# Patient Record
Sex: FEMALE | Race: WHITE | NOT HISPANIC OR LATINO | Employment: UNEMPLOYED | URBAN - METROPOLITAN AREA
[De-identification: names, ages, dates, MRNs, and addresses within clinical notes are randomized per-mention and may not be internally consistent; named-entity substitution may affect disease eponyms.]

---

## 2023-08-14 ENCOUNTER — HOSPITAL ENCOUNTER (OUTPATIENT)
Dept: DATA CONVERSION | Facility: HOSPITAL | Age: 63
Discharge: HOME | End: 2023-08-14

## 2023-08-14 DIAGNOSIS — R05.9 COUGH, UNSPECIFIED: ICD-10-CM

## 2023-08-14 DIAGNOSIS — R06.02 SHORTNESS OF BREATH: ICD-10-CM

## 2023-09-16 VITALS
OXYGEN SATURATION: 97 % | BODY MASS INDEX: 17.36 KG/M2 | DIASTOLIC BLOOD PRESSURE: 78 MMHG | HEIGHT: 66 IN | TEMPERATURE: 98.1 F | HEART RATE: 89 BPM | SYSTOLIC BLOOD PRESSURE: 122 MMHG | WEIGHT: 108 LBS

## 2023-09-19 PROBLEM — G89.29 OTHER CHRONIC PAIN: Status: ACTIVE | Noted: 2023-09-19

## 2023-09-19 PROBLEM — H02.88B MEIBOMIAN GLAND DYSFUNCTION (MGD) OF UPPER AND LOWER EYELID OF LEFT EYE: Status: ACTIVE | Noted: 2023-09-19

## 2023-09-19 PROBLEM — H26.9 CATARACT: Status: ACTIVE | Noted: 2023-09-19

## 2023-09-19 PROBLEM — C50.919 BREAST CANCER (MULTI): Status: ACTIVE | Noted: 2023-09-19

## 2023-09-19 PROBLEM — Z15.09 BRCA GENE MUTATION TEST POSITIVE: Status: ACTIVE | Noted: 2023-09-19

## 2023-09-19 PROBLEM — E78.5 HYPERLIPIDEMIA: Status: ACTIVE | Noted: 2023-09-19

## 2023-09-19 PROBLEM — Z85.3 PERSONAL HISTORY OF MALIGNANT NEOPLASM OF BREAST: Status: ACTIVE | Noted: 2023-09-19

## 2023-09-19 PROBLEM — F41.9 ANXIETY: Status: ACTIVE | Noted: 2023-09-19

## 2023-09-19 PROBLEM — C91.10 CHRONIC LYMPHOCYTIC LEUKEMIA (MULTI): Status: ACTIVE | Noted: 2023-09-19

## 2023-09-19 PROBLEM — G56.00 CARPAL TUNNEL SYNDROME: Status: ACTIVE | Noted: 2023-09-19

## 2023-09-19 PROBLEM — M79.7 FIBROMYALGIA: Status: ACTIVE | Noted: 2023-09-19

## 2023-09-19 PROBLEM — M79.89 FOOT SWELLING: Status: ACTIVE | Noted: 2023-09-19

## 2023-09-19 PROBLEM — M81.0 OSTEOPOROSIS: Status: ACTIVE | Noted: 2023-09-19

## 2023-09-19 PROBLEM — M79.672 LEFT FOOT PAIN: Status: ACTIVE | Noted: 2023-09-19

## 2023-09-19 PROBLEM — R79.89 LOW VITAMIN B12 LEVEL: Status: ACTIVE | Noted: 2023-09-19

## 2023-09-19 PROBLEM — E55.9 VITAMIN D DEFICIENCY: Status: ACTIVE | Noted: 2023-09-19

## 2023-09-19 PROBLEM — Z95.828 PORT-A-CATH IN PLACE: Status: ACTIVE | Noted: 2023-09-19

## 2023-09-19 PROBLEM — H02.88A MEIBOMIAN GLAND DYSFUNCTION (MGD) OF UPPER AND LOWER EYELID OF RIGHT EYE: Status: ACTIVE | Noted: 2023-09-19

## 2023-09-19 PROBLEM — G60.9 HEREDITARY AND IDIOPATHIC NEUROPATHY, UNSPECIFIED: Status: ACTIVE | Noted: 2023-09-19

## 2023-09-19 PROBLEM — Z97.3 WEARS GLASSES: Status: ACTIVE | Noted: 2023-09-19

## 2023-09-19 PROBLEM — H04.123 DRY EYES: Status: ACTIVE | Noted: 2023-09-19

## 2023-09-19 PROBLEM — Z15.01 BRCA GENE MUTATION TEST POSITIVE: Status: ACTIVE | Noted: 2023-09-19

## 2023-09-19 PROBLEM — M19.90 OSTEOARTHRITIS: Status: ACTIVE | Noted: 2023-09-19

## 2023-09-19 PROBLEM — S92.919A CLOSED FRACTURE OF ONE OR MORE PHALANGES OF FOOT: Status: ACTIVE | Noted: 2023-09-19

## 2023-09-19 PROBLEM — S05.02XA CORNEAL ABRASION, LEFT: Status: ACTIVE | Noted: 2023-09-19

## 2023-09-19 PROBLEM — R07.81 RIB PAIN: Status: ACTIVE | Noted: 2023-09-19

## 2023-09-19 PROBLEM — Z96.1 BILATERAL PSEUDOPHAKIA: Status: ACTIVE | Noted: 2023-09-19

## 2023-09-19 RX ORDER — PREGABALIN 75 MG/1
1 CAPSULE ORAL 2 TIMES DAILY
COMMUNITY
Start: 2020-05-14 | End: 2023-11-28 | Stop reason: ALTCHOICE

## 2023-09-19 RX ORDER — HYDROXYZINE PAMOATE 25 MG/1
25 CAPSULE ORAL EVERY 8 HOURS PRN
COMMUNITY
End: 2024-01-02

## 2023-09-19 RX ORDER — TRIAMCINOLONE ACETONIDE 1 MG/G
1 CREAM TOPICAL
COMMUNITY
End: 2023-11-28 | Stop reason: ALTCHOICE

## 2023-09-19 RX ORDER — LORATADINE 10 MG/1
10 TABLET ORAL DAILY
COMMUNITY
End: 2023-11-28 | Stop reason: WASHOUT

## 2023-09-26 ENCOUNTER — HOSPITAL ENCOUNTER (OUTPATIENT)
Dept: DATA CONVERSION | Facility: HOSPITAL | Age: 63
Discharge: HOME | End: 2023-09-26
Payer: COMMERCIAL

## 2023-09-26 DIAGNOSIS — E78.5 HYPERLIPIDEMIA, UNSPECIFIED: ICD-10-CM

## 2023-09-26 DIAGNOSIS — E53.8 DEFICIENCY OF OTHER SPECIFIED B GROUP VITAMINS: ICD-10-CM

## 2023-09-26 DIAGNOSIS — E55.9 VITAMIN D DEFICIENCY, UNSPECIFIED: ICD-10-CM

## 2023-09-26 LAB
25(OH)D3 SERPL-MCNC: 44 NG/ML (ref 31–100)
ALBUMIN SERPL-MCNC: 4.2 GM/DL (ref 3.5–5)
ALBUMIN/GLOB SERPL: 1.4 RATIO (ref 1.5–3)
ALP BLD-CCNC: 66 U/L (ref 35–125)
ALT SERPL-CCNC: 15 U/L (ref 5–40)
ANION GAP SERPL CALCULATED.3IONS-SCNC: 12 MMOL/L (ref 0–19)
APPEARANCE PLAS: ABNORMAL
AST SERPL-CCNC: 17 U/L (ref 5–40)
BILIRUB SERPL-MCNC: 0.4 MG/DL (ref 0.1–1.2)
BUN SERPL-MCNC: 8 MG/DL (ref 8–25)
BUN/CREAT SERPL: 11.4 RATIO (ref 8–21)
CALCIUM SERPL-MCNC: 9.4 MG/DL (ref 8.5–10.4)
CHLORIDE SERPL-SCNC: 102 MMOL/L (ref 97–107)
CHOLEST SERPL-MCNC: 222 MG/DL (ref 133–200)
CHOLEST/HDLC SERPL: 2.7 RATIO
CO2 SERPL-SCNC: 24 MMOL/L (ref 24–31)
COLOR SPUN FLD: ABNORMAL
CREAT SERPL-MCNC: 0.7 MG/DL (ref 0.4–1.6)
DEPRECATED RDW RBC AUTO: 44.2 FL (ref 37–54)
ERYTHROCYTE [DISTWIDTH] IN BLOOD BY AUTOMATED COUNT: 13.2 % (ref 11.7–15)
FASTING STATUS PATIENT QL REPORTED: ABNORMAL
GFR SERPL CREATININE-BSD FRML MDRD: 96 ML/MIN/1.73 M2
GLOBULIN SER-MCNC: 3 G/DL (ref 1.9–3.7)
GLUCOSE SERPL-MCNC: 88 MG/DL (ref 65–99)
HCT VFR BLD AUTO: 43 % (ref 36–44)
HDLC SERPL-MCNC: 81 MG/DL
HGB BLD-MCNC: 14.2 GM/DL (ref 12–15)
LDLC SERPL CALC-MCNC: 124 MG/DL (ref 65–130)
MCH RBC QN AUTO: 30.1 PG (ref 26–34)
MCHC RBC AUTO-ENTMCNC: 33 % (ref 31–37)
MCV RBC AUTO: 91.1 FL (ref 80–100)
NRBC BLD-RTO: 0 /100 WBC
PLATELET # BLD AUTO: 312 K/UL (ref 150–450)
PMV BLD AUTO: 9.5 CU (ref 7–12.6)
POTASSIUM SERPL-SCNC: 4 MMOL/L (ref 3.4–5.1)
PROT SERPL-MCNC: 7.2 G/DL (ref 5.9–7.9)
RBC # BLD AUTO: 4.72 M/UL (ref 4–4.9)
SODIUM SERPL-SCNC: 137 MMOL/L (ref 133–145)
TRIGL SERPL-MCNC: 83 MG/DL (ref 40–150)
TSH SERPL DL<=0.05 MIU/L-ACNC: 1.25 MIU/L (ref 0.27–4.2)
VIT B12 SERPL-MCNC: 306 PG/ML (ref 211–946)
WBC # BLD AUTO: 8.3 K/UL (ref 4.5–11)

## 2023-10-13 ENCOUNTER — TELEPHONE (OUTPATIENT)
Dept: PRIMARY CARE | Facility: CLINIC | Age: 63
End: 2023-10-13
Payer: COMMERCIAL

## 2023-10-13 NOTE — TELEPHONE ENCOUNTER
PT called in requesting to schedule appointment for B12 injection, but is unaware of when she is due to receive it, or how many times she should come in. Please advise.

## 2023-10-19 ENCOUNTER — TELEPHONE (OUTPATIENT)
Dept: PRIMARY CARE | Facility: CLINIC | Age: 63
End: 2023-10-19
Payer: COMMERCIAL

## 2023-10-19 ENCOUNTER — APPOINTMENT (OUTPATIENT)
Dept: HEMATOLOGY/ONCOLOGY | Facility: CLINIC | Age: 63
End: 2023-10-19
Payer: COMMERCIAL

## 2023-10-19 DIAGNOSIS — E53.8 B12 DEFICIENCY: ICD-10-CM

## 2023-10-19 RX ORDER — CYANOCOBALAMIN 1000 UG/ML
1000 INJECTION, SOLUTION INTRAMUSCULAR; SUBCUTANEOUS ONCE
Status: COMPLETED | OUTPATIENT
Start: 2023-10-20 | End: 2023-10-20

## 2023-10-20 ENCOUNTER — CLINICAL SUPPORT (OUTPATIENT)
Dept: PRIMARY CARE | Facility: CLINIC | Age: 63
End: 2023-10-20
Payer: COMMERCIAL

## 2023-10-20 PROCEDURE — 2500000004 HC RX 250 GENERAL PHARMACY W/ HCPCS (ALT 636 FOR OP/ED): Performed by: INTERNAL MEDICINE

## 2023-10-20 RX ADMIN — CYANOCOBALAMIN 1000 MCG: 1000 INJECTION, SOLUTION SUBCUTANEOUS at 14:48

## 2023-10-24 RX ORDER — HEPARIN SODIUM,PORCINE/PF 10 UNIT/ML
50 SYRINGE (ML) INTRAVENOUS AS NEEDED
Status: CANCELLED | OUTPATIENT
Start: 2023-10-26

## 2023-10-24 RX ORDER — HEPARIN 100 UNIT/ML
500 SYRINGE INTRAVENOUS AS NEEDED
Status: CANCELLED | OUTPATIENT
Start: 2023-10-26

## 2023-10-26 ENCOUNTER — INFUSION (OUTPATIENT)
Dept: HEMATOLOGY/ONCOLOGY | Facility: CLINIC | Age: 63
End: 2023-10-26
Payer: COMMERCIAL

## 2023-10-26 DIAGNOSIS — Z85.3 PERSONAL HISTORY OF MALIGNANT NEOPLASM OF BREAST: ICD-10-CM

## 2023-10-26 PROCEDURE — 2500000004 HC RX 250 GENERAL PHARMACY W/ HCPCS (ALT 636 FOR OP/ED): Mod: SE | Performed by: NURSE PRACTITIONER

## 2023-10-26 PROCEDURE — 96523 IRRIG DRUG DELIVERY DEVICE: CPT

## 2023-10-26 RX ORDER — HEPARIN SODIUM,PORCINE/PF 10 UNIT/ML
50 SYRINGE (ML) INTRAVENOUS AS NEEDED
Status: CANCELLED | OUTPATIENT
Start: 2023-10-26

## 2023-10-26 RX ORDER — HEPARIN 100 UNIT/ML
500 SYRINGE INTRAVENOUS AS NEEDED
Status: DISCONTINUED | OUTPATIENT
Start: 2023-10-26 | End: 2023-10-26 | Stop reason: HOSPADM

## 2023-10-26 RX ORDER — HEPARIN SODIUM,PORCINE/PF 10 UNIT/ML
50 SYRINGE (ML) INTRAVENOUS AS NEEDED
Status: DISCONTINUED | OUTPATIENT
Start: 2023-10-26 | End: 2023-10-26 | Stop reason: HOSPADM

## 2023-10-26 RX ORDER — HEPARIN 100 UNIT/ML
500 SYRINGE INTRAVENOUS AS NEEDED
Status: CANCELLED | OUTPATIENT
Start: 2023-10-26

## 2023-10-26 RX ADMIN — HEPARIN 500 UNITS: 100 SYRINGE at 13:36

## 2023-11-06 ENCOUNTER — HOSPITAL ENCOUNTER (OUTPATIENT)
Dept: RADIOLOGY | Facility: HOSPITAL | Age: 63
Discharge: HOME | End: 2023-11-06
Payer: COMMERCIAL

## 2023-11-06 ENCOUNTER — CLINICAL SUPPORT (OUTPATIENT)
Dept: VASCULAR MEDICINE | Facility: CLINIC | Age: 63
End: 2023-11-06
Payer: COMMERCIAL

## 2023-11-06 ENCOUNTER — HOSPITAL ENCOUNTER (OUTPATIENT)
Dept: RESPIRATORY THERAPY | Facility: HOSPITAL | Age: 63
Setting detail: THERAPIES SERIES
Discharge: STILL ADMITTED OR ADMINISTRATIVE | End: 2023-11-06
Payer: COMMERCIAL

## 2023-11-06 DIAGNOSIS — M81.0 AGE-RELATED OSTEOPOROSIS WITHOUT CURRENT PATHOLOGICAL FRACTURE: ICD-10-CM

## 2023-11-06 DIAGNOSIS — Z13.6 ENCOUNTER FOR SCREENING FOR CARDIOVASCULAR DISORDERS: ICD-10-CM

## 2023-11-06 DIAGNOSIS — R06.02 SHORTNESS OF BREATH: ICD-10-CM

## 2023-11-06 PROCEDURE — 2500000002 HC RX 250 W HCPCS SELF ADMINISTERED DRUGS (ALT 637 FOR MEDICARE OP, ALT 636 FOR OP/ED)

## 2023-11-06 PROCEDURE — 94640 AIRWAY INHALATION TREATMENT: CPT

## 2023-11-06 PROCEDURE — 76706 US ABDL AORTA SCREEN AAA: CPT

## 2023-11-06 PROCEDURE — 76706 US ABDL AORTA SCREEN AAA: CPT | Performed by: INTERNAL MEDICINE

## 2023-11-06 PROCEDURE — 77085 DXA BONE DENSITY AXL VRT FX: CPT

## 2023-11-06 PROCEDURE — 94060 EVALUATION OF WHEEZING: CPT

## 2023-11-06 RX ORDER — ALBUTEROL SULFATE 0.83 MG/ML
SOLUTION RESPIRATORY (INHALATION)
Status: COMPLETED
Start: 2023-11-06 | End: 2023-11-06

## 2023-11-06 RX ADMIN — ALBUTEROL SULFATE: 2.5 SOLUTION RESPIRATORY (INHALATION) at 11:45

## 2023-11-08 ENCOUNTER — TELEPHONE (OUTPATIENT)
Dept: PRIMARY CARE | Facility: CLINIC | Age: 63
End: 2023-11-08
Payer: COMMERCIAL

## 2023-11-08 DIAGNOSIS — J44.9 CHRONIC OBSTRUCTIVE PULMONARY DISEASE, UNSPECIFIED COPD TYPE (MULTI): Primary | ICD-10-CM

## 2023-11-16 ENCOUNTER — APPOINTMENT (OUTPATIENT)
Dept: HEMATOLOGY/ONCOLOGY | Facility: CLINIC | Age: 63
End: 2023-11-16
Payer: COMMERCIAL

## 2023-11-16 DIAGNOSIS — E53.8 VITAMIN B 12 DEFICIENCY: ICD-10-CM

## 2023-11-16 RX ORDER — CYANOCOBALAMIN 1000 UG/ML
1000 INJECTION, SOLUTION INTRAMUSCULAR; SUBCUTANEOUS ONCE
Status: COMPLETED | OUTPATIENT
Start: 2023-11-20 | End: 2023-11-20

## 2023-11-20 ENCOUNTER — TELEPHONE (OUTPATIENT)
Dept: HEMATOLOGY/ONCOLOGY | Facility: CLINIC | Age: 63
End: 2023-11-20

## 2023-11-20 ENCOUNTER — CLINICAL SUPPORT (OUTPATIENT)
Dept: PRIMARY CARE | Facility: CLINIC | Age: 63
End: 2023-11-20
Payer: COMMERCIAL

## 2023-11-20 DIAGNOSIS — E53.8 B12 DEFICIENCY: ICD-10-CM

## 2023-11-20 PROCEDURE — 2500000004 HC RX 250 GENERAL PHARMACY W/ HCPCS (ALT 636 FOR OP/ED): Performed by: INTERNAL MEDICINE

## 2023-11-20 RX ADMIN — CYANOCOBALAMIN 1000 MCG: 1000 INJECTION, SOLUTION SUBCUTANEOUS at 10:09

## 2023-11-20 NOTE — TELEPHONE ENCOUNTER
Spoke to Erin.  She describes very small lymph nodes, about 5 in her groin and she has developed some chest sweating at night. Denies fever or weight loss.  We were able to move up her appt. To November 28, que draw at 9am followed by appt with Francisco at 9:30pm.  Erin is aware fo the appt change.

## 2023-11-28 ENCOUNTER — OFFICE VISIT (OUTPATIENT)
Dept: HEMATOLOGY/ONCOLOGY | Facility: CLINIC | Age: 63
End: 2023-11-28
Payer: COMMERCIAL

## 2023-11-28 ENCOUNTER — INFUSION (OUTPATIENT)
Dept: HEMATOLOGY/ONCOLOGY | Facility: CLINIC | Age: 63
End: 2023-11-28
Payer: COMMERCIAL

## 2023-11-28 VITALS
BODY MASS INDEX: 16.65 KG/M2 | TEMPERATURE: 97.7 F | DIASTOLIC BLOOD PRESSURE: 82 MMHG | WEIGHT: 103.17 LBS | HEART RATE: 98 BPM | OXYGEN SATURATION: 99 % | RESPIRATION RATE: 18 BRPM | SYSTOLIC BLOOD PRESSURE: 146 MMHG

## 2023-11-28 DIAGNOSIS — C50.911 MALIGNANT NEOPLASM OF RIGHT BREAST IN FEMALE, ESTROGEN RECEPTOR NEGATIVE, UNSPECIFIED SITE OF BREAST (MULTI): ICD-10-CM

## 2023-11-28 DIAGNOSIS — Z15.01 BRCA GENE MUTATION TEST POSITIVE: Primary | ICD-10-CM

## 2023-11-28 DIAGNOSIS — C50.811 MALIGNANT NEOPLASM OF OVERLAPPING SITES OF RIGHT FEMALE BREAST (MULTI): ICD-10-CM

## 2023-11-28 DIAGNOSIS — Z85.3 PERSONAL HISTORY OF MALIGNANT NEOPLASM OF BREAST: ICD-10-CM

## 2023-11-28 DIAGNOSIS — Z17.1 MALIGNANT NEOPLASM OF RIGHT BREAST IN FEMALE, ESTROGEN RECEPTOR NEGATIVE, UNSPECIFIED SITE OF BREAST (MULTI): ICD-10-CM

## 2023-11-28 DIAGNOSIS — C50.812 MALIGNANT NEOPLASM OF OVERLAPPING SITES OF LEFT FEMALE BREAST (MULTI): ICD-10-CM

## 2023-11-28 DIAGNOSIS — C91.10 CHRONIC LYMPHOCYTIC LEUKEMIA (MULTI): ICD-10-CM

## 2023-11-28 DIAGNOSIS — Z15.09 BRCA GENE MUTATION TEST POSITIVE: Primary | ICD-10-CM

## 2023-11-28 LAB
ALBUMIN SERPL BCP-MCNC: 4 G/DL (ref 3.4–5)
ALP SERPL-CCNC: 55 U/L (ref 33–136)
ALT SERPL W P-5'-P-CCNC: 15 U/L (ref 7–45)
ANION GAP SERPL CALC-SCNC: 12 MMOL/L (ref 10–20)
AST SERPL W P-5'-P-CCNC: 16 U/L (ref 9–39)
BASOPHILS # BLD AUTO: 0.05 X10*3/UL (ref 0–0.1)
BASOPHILS NFR BLD AUTO: 0.8 %
BILIRUB SERPL-MCNC: 0.5 MG/DL (ref 0–1.2)
BUN SERPL-MCNC: 9 MG/DL (ref 6–23)
CALCIUM SERPL-MCNC: 8.8 MG/DL (ref 8.6–10.3)
CHLORIDE SERPL-SCNC: 99 MMOL/L (ref 98–107)
CO2 SERPL-SCNC: 26 MMOL/L (ref 21–32)
CREAT SERPL-MCNC: 0.68 MG/DL (ref 0.5–1.05)
EOSINOPHIL # BLD AUTO: 0.07 X10*3/UL (ref 0–0.7)
EOSINOPHIL NFR BLD AUTO: 1.2 %
ERYTHROCYTE [DISTWIDTH] IN BLOOD BY AUTOMATED COUNT: 12.4 % (ref 11.5–14.5)
GFR SERPL CREATININE-BSD FRML MDRD: >90 ML/MIN/1.73M*2
GLUCOSE SERPL-MCNC: 107 MG/DL (ref 74–99)
HCT VFR BLD AUTO: 40.2 % (ref 36–46)
HGB BLD-MCNC: 13.4 G/DL (ref 12–16)
IMM GRANULOCYTES # BLD AUTO: 0.04 X10*3/UL (ref 0–0.7)
IMM GRANULOCYTES NFR BLD AUTO: 0.7 % (ref 0–0.9)
LDH SERPL L TO P-CCNC: 157 U/L (ref 84–246)
LYMPHOCYTES # BLD AUTO: 1.57 X10*3/UL (ref 1.2–4.8)
LYMPHOCYTES NFR BLD AUTO: 26.3 %
MCH RBC QN AUTO: 30 PG (ref 26–34)
MCHC RBC AUTO-ENTMCNC: 33.3 G/DL (ref 32–36)
MCV RBC AUTO: 90 FL (ref 80–100)
MONOCYTES # BLD AUTO: 0.44 X10*3/UL (ref 0.1–1)
MONOCYTES NFR BLD AUTO: 7.4 %
NEUTROPHILS # BLD AUTO: 3.79 X10*3/UL (ref 1.2–7.7)
NEUTROPHILS NFR BLD AUTO: 63.6 %
NRBC BLD-RTO: 0 /100 WBCS (ref 0–0)
PLATELET # BLD AUTO: 263 X10*3/UL (ref 150–450)
POTASSIUM SERPL-SCNC: 3.8 MMOL/L (ref 3.5–5.3)
PROT SERPL-MCNC: 6.5 G/DL (ref 6.4–8.2)
RBC # BLD AUTO: 4.46 X10*6/UL (ref 4–5.2)
SODIUM SERPL-SCNC: 133 MMOL/L (ref 136–145)
WBC # BLD AUTO: 6 X10*3/UL (ref 4.4–11.3)

## 2023-11-28 PROCEDURE — 83615 LACTATE (LD) (LDH) ENZYME: CPT

## 2023-11-28 PROCEDURE — 1125F AMNT PAIN NOTED PAIN PRSNT: CPT | Performed by: NURSE PRACTITIONER

## 2023-11-28 PROCEDURE — 99214 OFFICE O/P EST MOD 30 MIN: CPT | Performed by: NURSE PRACTITIONER

## 2023-11-28 PROCEDURE — 80053 COMPREHEN METABOLIC PANEL: CPT

## 2023-11-28 PROCEDURE — 85025 COMPLETE CBC W/AUTO DIFF WBC: CPT

## 2023-11-28 RX ORDER — HEPARIN SODIUM,PORCINE/PF 10 UNIT/ML
50 SYRINGE (ML) INTRAVENOUS AS NEEDED
Status: CANCELLED | OUTPATIENT
Start: 2023-11-28

## 2023-11-28 RX ORDER — FLUOCINONIDE 0.5 MG/G
CREAM TOPICAL AS NEEDED
COMMUNITY
Start: 2023-04-26

## 2023-11-28 RX ORDER — HEPARIN 100 UNIT/ML
500 SYRINGE INTRAVENOUS AS NEEDED
Status: CANCELLED | OUTPATIENT
Start: 2023-11-28

## 2023-11-28 RX ORDER — PREGABALIN 50 MG/1
50 CAPSULE ORAL 3 TIMES DAILY PRN
COMMUNITY
Start: 2023-11-24 | End: 2024-04-08

## 2023-11-28 RX ORDER — HEPARIN 100 UNIT/ML
500 SYRINGE INTRAVENOUS AS NEEDED
Status: DISCONTINUED | OUTPATIENT
Start: 2023-11-28 | End: 2023-11-28 | Stop reason: HOSPADM

## 2023-11-28 RX ORDER — HEPARIN SODIUM,PORCINE/PF 10 UNIT/ML
50 SYRINGE (ML) INTRAVENOUS AS NEEDED
Status: DISCONTINUED | OUTPATIENT
Start: 2023-11-28 | End: 2023-11-28 | Stop reason: HOSPADM

## 2023-11-28 ASSESSMENT — COLUMBIA-SUICIDE SEVERITY RATING SCALE - C-SSRS
2. HAVE YOU ACTUALLY HAD ANY THOUGHTS OF KILLING YOURSELF?: NO
1. IN THE PAST MONTH, HAVE YOU WISHED YOU WERE DEAD OR WISHED YOU COULD GO TO SLEEP AND NOT WAKE UP?: NO
6. HAVE YOU EVER DONE ANYTHING, STARTED TO DO ANYTHING, OR PREPARED TO DO ANYTHING TO END YOUR LIFE?: NO

## 2023-11-28 ASSESSMENT — PATIENT HEALTH QUESTIONNAIRE - PHQ9
SUM OF ALL RESPONSES TO PHQ9 QUESTIONS 1 AND 2: 0
2. FEELING DOWN, DEPRESSED OR HOPELESS: NOT AT ALL
1. LITTLE INTEREST OR PLEASURE IN DOING THINGS: NOT AT ALL

## 2023-11-28 ASSESSMENT — ENCOUNTER SYMPTOMS
LOSS OF SENSATION IN FEET: 1
DEPRESSION: 0
OCCASIONAL FEELINGS OF UNSTEADINESS: 1

## 2023-11-28 ASSESSMENT — PAIN SCALES - GENERAL: PAINLEVEL: 2

## 2023-11-28 NOTE — PROGRESS NOTES
Patient here for follow up with Francisco Muller. Medications and allergies reviewed. No refills needed at this time.

## 2023-11-28 NOTE — PROGRESS NOTES
ID Statement:   ELIF COLON is a 62 year old Female    Treatment Synopsis:  1. Breast cancer initially stage I on the right breast diagnosed in 2002, status post four cycles of Adriamycin and Cytoxan. ER negative, MA negative, HER-2/jose angel negative.  2. Status post right lumpectomy, axillary lymph node dissection in the right breast with radiation in 2002.  3. Left-sided breast cancer in 2005, status post bilateral mastectomies and four cycles of Taxotere and left axillary lymph node dissection.  4. Status post total abdominal hysterectomy and bilateral salpingo-oophorectomy in February of 2006.  5. BRCA 1 positive.  6. Lateral chest wall lesion excised in October of 2006 that was pathologically negative.  7. CLL/SLL stage IA with good prognostic markers of normal cytogenetics, CD38 negative, ZAP-70 negative diagnosed by right submandibular lymph node excisional biopsy in May of 2012.  8. Tobacco abuse.    Interval History:  Patient returns today for routine one-year followup evaluation for history of a stage I right-sided breast cancer, diagnosed in 2002, now status post bilateral mastectomies and four cycles of Taxotere as well as history of CLL/SLL, diagnosed by right submandibular lymph node excisional biopsy May 2012.     On presentation today she voices concerns of lymph nodes that have developed in the inguinal region bilaterally over the past 6 weeks. She also reports night sweats. She denies any fevers or chills. No cough, chest pain or shortness of breath. No nausea or vomiting. No constipation or diarrhea. She has ongoing pruritus which has been an issue for some time now. She takes lyrica and hydroxyzine as needed. She has some neuropathic pain in her wrists bilaterally, likely carpal syndrome, worse on her left arm. She has lost about 6 pounds in the past 6 months.     The patient does still have her right Mediport in place. This was exchanged on April 11, 2023.  This is being flushed on an every  six-week basis. She has had several ports since her original breast cancer diagnosis and bilateral mastectomies.     Review of Systems:  A review of systems has been completed and are negative for complaints except what is stated in the HPI and/or past medical history    Allergies:  Reviewed in EMR    Medications:  Medication list reviewed with patient and updated in EMR    Social History:  smokes about 1/4 pack per day  social alcohol   occasional marijuana     Vital Signs:  /82, pulse 98, resp 18, temp 36.5  Weight 46.8    Physical Exam:  ECO  Pain: chronic back pain  Constitutional: Well developed, awake/alert/oriented x3, no distress, alert and cooperative  Eyes: PER. sclera anicteric  ENMT: Oral mucosa moist  Respiratory/Thorax: Breathing is non-labored. Lungs are clear to auscultation bilaterally. No adventitious breath sounds  Cardiovascular: S1-S2. Regular rate and rhythm. No murmurs, rubs, or gallops appreciated  Gastrointestinal: Abdomen soft nontender, nondistended, normal active bowel sounds.   Musculoskeletal: ROM intact, no joint swelling, normal strength  Extremities: normal extremities, no cyanosis, no edema, no clubbing  Breast: s/p bilateral mastectomy no chest wall masses or lesions  Lymphatics: no cervical, supraclavicular or axillary lymphadenopathy. Multiple palpable lymph nodes noted inguinal region bilaterally.   Neurologic: alert and oriented x3. Nonfocal exam. No myoclonus  Psychological: Pleasant, appropriate and easily engaged     Labs:  2023  WBC 6.0, hemoglobin 13.4, hematocrit 40.2, platelets 263,000      Assessment:  Breast cancer:  - continue annual physical exam    CLL/SLL stage IA:  - CT chest, abd and pelvis  - - palpable inguinal lymph nodes and night sweats     Immune prophylaxis:  - Declines all immunizations.    Mediport:   - continues to flush Mediport every six weeks.      Plan:  - 1 year follow-up  - labs 6 months and 12 months  - every six  week Mediport flush.  - - follow up sooner pending results of CT scan

## 2023-12-04 ENCOUNTER — APPOINTMENT (OUTPATIENT)
Dept: RADIOLOGY | Facility: HOSPITAL | Age: 63
End: 2023-12-04
Payer: COMMERCIAL

## 2023-12-05 ENCOUNTER — ANCILLARY PROCEDURE (OUTPATIENT)
Dept: RADIOLOGY | Facility: CLINIC | Age: 63
End: 2023-12-05
Payer: COMMERCIAL

## 2023-12-05 ENCOUNTER — INFUSION (OUTPATIENT)
Dept: HEMATOLOGY/ONCOLOGY | Facility: CLINIC | Age: 63
End: 2023-12-05
Payer: COMMERCIAL

## 2023-12-05 DIAGNOSIS — Z17.1 MALIGNANT NEOPLASM OF RIGHT BREAST IN FEMALE, ESTROGEN RECEPTOR NEGATIVE, UNSPECIFIED SITE OF BREAST (MULTI): Primary | ICD-10-CM

## 2023-12-05 DIAGNOSIS — Z15.01 BRCA GENE MUTATION TEST POSITIVE: ICD-10-CM

## 2023-12-05 DIAGNOSIS — Z15.09 BRCA GENE MUTATION TEST POSITIVE: ICD-10-CM

## 2023-12-05 DIAGNOSIS — C50.911 MALIGNANT NEOPLASM OF RIGHT BREAST IN FEMALE, ESTROGEN RECEPTOR NEGATIVE, UNSPECIFIED SITE OF BREAST (MULTI): Primary | ICD-10-CM

## 2023-12-05 DIAGNOSIS — C91.10 CHRONIC LYMPHOCYTIC LEUKEMIA (MULTI): ICD-10-CM

## 2023-12-05 DIAGNOSIS — Z85.3 PERSONAL HISTORY OF MALIGNANT NEOPLASM OF BREAST: ICD-10-CM

## 2023-12-05 PROCEDURE — 74177 CT ABD & PELVIS W/CONTRAST: CPT

## 2023-12-05 PROCEDURE — 2550000001 HC RX 255 CONTRASTS: Performed by: NURSE PRACTITIONER

## 2023-12-05 PROCEDURE — 2500000004 HC RX 250 GENERAL PHARMACY W/ HCPCS (ALT 636 FOR OP/ED): Mod: SE | Performed by: NURSE PRACTITIONER

## 2023-12-05 PROCEDURE — 96523 IRRIG DRUG DELIVERY DEVICE: CPT

## 2023-12-05 RX ORDER — HEPARIN 100 UNIT/ML
500 SYRINGE INTRAVENOUS AS NEEDED
Status: CANCELLED | OUTPATIENT
Start: 2023-12-05

## 2023-12-05 RX ORDER — HEPARIN SODIUM,PORCINE/PF 10 UNIT/ML
50 SYRINGE (ML) INTRAVENOUS AS NEEDED
Status: CANCELLED | OUTPATIENT
Start: 2023-12-05

## 2023-12-05 RX ORDER — HEPARIN 100 UNIT/ML
500 SYRINGE INTRAVENOUS AS NEEDED
Status: DISCONTINUED | OUTPATIENT
Start: 2023-12-05 | End: 2023-12-05 | Stop reason: HOSPADM

## 2023-12-05 RX ORDER — HEPARIN SODIUM,PORCINE/PF 10 UNIT/ML
50 SYRINGE (ML) INTRAVENOUS AS NEEDED
Status: DISCONTINUED | OUTPATIENT
Start: 2023-12-05 | End: 2023-12-05 | Stop reason: HOSPADM

## 2023-12-05 RX ADMIN — HEPARIN 500 UNITS: 100 SYRINGE at 09:44

## 2023-12-05 RX ADMIN — IOHEXOL 75 ML: 350 INJECTION, SOLUTION INTRAVENOUS at 09:51

## 2023-12-08 ENCOUNTER — TELEPHONE (OUTPATIENT)
Dept: HEMATOLOGY/ONCOLOGY | Facility: CLINIC | Age: 63
End: 2023-12-08
Payer: COMMERCIAL

## 2023-12-12 ENCOUNTER — APPOINTMENT (OUTPATIENT)
Dept: HEMATOLOGY/ONCOLOGY | Facility: CLINIC | Age: 63
End: 2023-12-12
Payer: COMMERCIAL

## 2023-12-14 ENCOUNTER — APPOINTMENT (OUTPATIENT)
Dept: HEMATOLOGY/ONCOLOGY | Facility: CLINIC | Age: 63
End: 2023-12-14
Payer: COMMERCIAL

## 2023-12-15 DIAGNOSIS — C91.10 CHRONIC LYMPHOCYTIC LEUKEMIA (MULTI): ICD-10-CM

## 2023-12-18 ENCOUNTER — OFFICE VISIT (OUTPATIENT)
Dept: PULMONOLOGY | Facility: CLINIC | Age: 63
End: 2023-12-18
Payer: COMMERCIAL

## 2023-12-18 VITALS
HEIGHT: 66 IN | WEIGHT: 106 LBS | BODY MASS INDEX: 17.04 KG/M2 | TEMPERATURE: 98 F | SYSTOLIC BLOOD PRESSURE: 142 MMHG | HEART RATE: 82 BPM | OXYGEN SATURATION: 96 % | DIASTOLIC BLOOD PRESSURE: 86 MMHG

## 2023-12-18 DIAGNOSIS — J30.9 ALLERGIC RHINITIS, UNSPECIFIED SEASONALITY, UNSPECIFIED TRIGGER: Primary | ICD-10-CM

## 2023-12-18 DIAGNOSIS — E53.8 B12 DEFICIENCY: ICD-10-CM

## 2023-12-18 DIAGNOSIS — F17.210 CIGARETTE NICOTINE DEPENDENCE WITHOUT COMPLICATION: ICD-10-CM

## 2023-12-18 DIAGNOSIS — J44.9 CHRONIC OBSTRUCTIVE PULMONARY DISEASE, UNSPECIFIED COPD TYPE (MULTI): ICD-10-CM

## 2023-12-18 PROCEDURE — 1159F MED LIST DOCD IN RCRD: CPT | Performed by: NURSE PRACTITIONER

## 2023-12-18 PROCEDURE — 99203 OFFICE O/P NEW LOW 30 MIN: CPT | Performed by: NURSE PRACTITIONER

## 2023-12-18 PROCEDURE — 1125F AMNT PAIN NOTED PAIN PRSNT: CPT | Performed by: NURSE PRACTITIONER

## 2023-12-18 PROCEDURE — 99213 OFFICE O/P EST LOW 20 MIN: CPT | Performed by: NURSE PRACTITIONER

## 2023-12-18 RX ORDER — LORATADINE 10 MG/1
10 TABLET ORAL DAILY
Qty: 30 TABLET | Refills: 3 | Status: SHIPPED | OUTPATIENT
Start: 2023-12-18

## 2023-12-18 RX ORDER — TIOTROPIUM BROMIDE AND OLODATEROL 3.124; 2.736 UG/1; UG/1
2 SPRAY, METERED RESPIRATORY (INHALATION) DAILY
Qty: 4 G | Refills: 3 | Status: SHIPPED | OUTPATIENT
Start: 2023-12-18 | End: 2024-01-26 | Stop reason: SINTOL

## 2023-12-18 RX ORDER — ALBUTEROL SULFATE 90 UG/1
2 AEROSOL, METERED RESPIRATORY (INHALATION) EVERY 4 HOURS PRN
Qty: 18 G | Refills: 11 | Status: SHIPPED | OUTPATIENT
Start: 2023-12-18 | End: 2023-12-18 | Stop reason: SDUPTHER

## 2023-12-18 RX ORDER — LORATADINE 10 MG/1
10 TABLET ORAL DAILY
Qty: 30 TABLET | Refills: 3 | Status: SHIPPED | OUTPATIENT
Start: 2023-12-18 | End: 2023-12-18 | Stop reason: SDUPTHER

## 2023-12-18 RX ORDER — ALBUTEROL SULFATE 90 UG/1
2 AEROSOL, METERED RESPIRATORY (INHALATION) EVERY 4 HOURS PRN
Qty: 18 G | Refills: 11 | Status: SHIPPED | OUTPATIENT
Start: 2023-12-18

## 2023-12-18 RX ORDER — CYANOCOBALAMIN 1000 UG/ML
1000 INJECTION, SOLUTION INTRAMUSCULAR; SUBCUTANEOUS ONCE
Status: COMPLETED | OUTPATIENT
Start: 2023-12-20 | End: 2023-12-20

## 2023-12-18 RX ORDER — TIOTROPIUM BROMIDE AND OLODATEROL 3.124; 2.736 UG/1; UG/1
2 SPRAY, METERED RESPIRATORY (INHALATION) DAILY
Qty: 4 G | Refills: 3 | Status: SHIPPED | OUTPATIENT
Start: 2023-12-18 | End: 2023-12-18 | Stop reason: SDUPTHER

## 2023-12-18 ASSESSMENT — PAIN SCALES - GENERAL: PAINLEVEL: 4

## 2023-12-18 NOTE — PROGRESS NOTES
Subjective   Patient ID: Erin Ramachandran is a 63 y.o. female who presents for New Patient Visit.    HPI  Ms. Ramachandran is a 63 year old  female (current smoker, ~23 pack year history) with a PMHx of R breast cancer dx 2002 s/p chemo/radiation, lumpectomy & LN dissection; L breast cancer dx 2005 s/p bilateral mastectomy, chemo, LN dissection; CLL/SLL dx 2012. Here for initial evaluation for shortness of breath.     PCP: Teresa Knox    HPI: Patient reports that she is here as recommended by her PCP after she had breathing tests for her SOB. Per patient she has had ongoing shortness of breath with exertion for several years that has progressively gotten worse. She notes her SOB is worse with weather changes and certain chemicals/fumes (wood stain, bleach). She has nasal congestion and post-nasal drip. Has intermittent cough, usually non-productive but occasionally clear sputum production. She has occasional wheezing. She has never used inhalers. She denies acid reflux, chest pain, fever or chills. Denies appetite changes or unexpected weight changes. She denies BLE edema or orthopnea. Has chronic headaches, denies recent changes in frequency or severity.   CAT today 20    Previous pulmonary history:   She has no history of recurrent infections, or lung disease as a child.  She had no previous lung hx, never on oxygen or inhaler therapy.     Inhalers/nebulized medications: none     Hospitalization History: She has not been hospitalized over the last year for breathing related problem.    Sleep history:  Snores. Denies apnea, feeling tired during the day or taking naps during the day.       PMHx: R breast cancer dx 2002 s/p chemo/radiation, lumpectomy & LN dissection; L breast cancer dx 2005 s/p bilateral mastectomy, chemo, LN dissection; CLL/SLL dx 2012  Surg Hx: bilateral mastectomy    Social Hx:  -smoking: current smoker, 1 PPD x 20 years, cut back to 1/4 PPD about 10 years ago (~23 pack year history)  -ETOH:  occasional  -illicit drugs: occasional marijuana (smoking & gummies)  -occupation:   -exposures: +known asbestos exposure; denies known silica, beryllium, molds, dusts, chemicals/fumes     Testing:  PFT:  -11/6/23: moderate obstructive ventilator defect with no BD response (FEV1 62%), hyperinflation with air trapping, DLCO normal     CT Chest:  -12/5/23 CT C/A/P: IMPRESSION: Mild-moderate diffuse centrilobular and paraseptal emphysema, more prominent in upper lobes. There are several scattered calcified pleural plaques which can be r/t prior asbestos exposure. Lungs are otherwise clear. No evidence for thoracic lymphadenopathy. No evidence for acute pathology within the abdomen or pelvis. No evidence for abdominal or pelvic lymphadenopathy identified. No evidence for inguinal lymphadenopathy.       Review of Systems  10 point ROS complete and negative except as documented in HPI     Objective   Physical Exam  Vitals reviewed.   Constitutional:       General: She is not in acute distress.     Appearance: Normal appearance. She is not ill-appearing.   HENT:      Head: Normocephalic.      Nose: Nose normal.      Mouth/Throat:      Mouth: Mucous membranes are moist.      Pharynx: Oropharynx is clear.   Eyes:      General: No scleral icterus.  Cardiovascular:      Rate and Rhythm: Normal rate and regular rhythm.      Pulses: Normal pulses.      Heart sounds: Normal heart sounds. No murmur heard.  Pulmonary:      Effort: Pulmonary effort is normal. No respiratory distress.      Breath sounds: No wheezing, rhonchi or rales.      Comments: Diminished throughout; barrel chested   Abdominal:      General: Bowel sounds are normal. There is no distension.      Palpations: Abdomen is soft.   Musculoskeletal:         General: Normal range of motion.      Cervical back: Normal range of motion and neck supple.      Right lower leg: No edema.      Left lower leg: No edema.   Skin:     General: Skin is warm and dry.    Neurological:      General: No focal deficit present.      Mental Status: She is alert and oriented to person, place, and time.         Assessment/Plan   Ms. Ramachandran is a 63 year old  female (current smoker, ~23 pack year history) with a PMHx of R breast cancer dx 2002 s/p chemo/radiation, lumpectomy & LN dissection; L breast cancer dx 2005 s/p bilateral mastectomy, chemo, LN dissection; CLL/SLL dx 2012. Here for initial evaluation for shortness of breath.     1) COPD (GOLD 2) - PFT 11/2023 FEV1 62%, hyperinflation with airtrapping, DLCO normal; current smoker; +asbestos exposure  -start stioloto 2 puffs once daily  -start albuterol PRN  -avoid triggers    2) Allergic rhinitis - nasal congestion, post-nasal drip  -start claritin once daily    3) Cigarette nicotine dependence - current smoker, ~23 pack year history; 1 PPD x 20 years, cut back to about 1/4 PPD 10 years ago; CT C/A/P completed 12/5/23 by oncology, no obvious nodules   -# Smoking cessation: Patient is currently smoking: >5 minutes smoking cessation counseling   - offered pharmacologic options to assist with quitting (discussed nicotine patches & lozenges or gum but she declined; advised is available OTC or she can call if changes her mind)     Patient would like to follow-up with Faraz Kaur in [place] d/t location proximity to home going forward.     Tayler West, ESTHER-CNP 12/18/23 10:17 AM

## 2023-12-18 NOTE — PATIENT INSTRUCTIONS
"Ms. Ramachandran -     It was a pleasure to see you today. We discussed your shortness of breath. Based on your breathing test results (PFTs), you have COPD.    Here are my recommendations:  -Start Stioloto Respimat inhaler (2 puffs once daily) -- if too expensive, please call so we can see if we can find one more affordable  -Start albuterol Inhaler 1-2 puffs every 4-6 hours as needed for shortness of breath. You can also take this 10-15 minutes prior to exertional activity to help \"prime\" your lungs.  -Start loratadine (Claritin) once daily for your allergies (nasal congestion/post-nasal drip)  -We discussed smoking cessation, please continue to work on stopping smoking. You can get nicotine patches & lozenges or gum over the counter if you decide you would like to try them.     Thank you for visiting the Pulmonary clinic today!   Return to clinic in 4-6 weeks or sooner if needed -- would like to follow with Faraz Kaur at [place]   Tayler West, [place]  My office number is [phone] - Mojgan is my  and Alyson is my nurse.     [phone] - scheduling    Best way to get a hold of me is to call my office --> Please do not send me [facility] messages.  Any test results will be discussed at next visit -- please make sure to make a follow up appt after testing.      "

## 2023-12-20 ENCOUNTER — CLINICAL SUPPORT (OUTPATIENT)
Dept: PRIMARY CARE | Facility: CLINIC | Age: 63
End: 2023-12-20
Payer: COMMERCIAL

## 2023-12-20 DIAGNOSIS — E53.8 VITAMIN B12 DEFICIENCY: ICD-10-CM

## 2023-12-20 PROCEDURE — 2500000004 HC RX 250 GENERAL PHARMACY W/ HCPCS (ALT 636 FOR OP/ED): Performed by: INTERNAL MEDICINE

## 2023-12-20 RX ADMIN — CYANOCOBALAMIN 1000 MCG: 1000 INJECTION, SOLUTION INTRAMUSCULAR at 10:17

## 2023-12-21 ENCOUNTER — TELEPHONE (OUTPATIENT)
Dept: HEMATOLOGY/ONCOLOGY | Facility: CLINIC | Age: 63
End: 2023-12-21
Payer: COMMERCIAL

## 2023-12-21 DIAGNOSIS — C91.10 CHRONIC LYMPHOCYTIC LEUKEMIA (MULTI): Primary | ICD-10-CM

## 2023-12-21 NOTE — TELEPHONE ENCOUNTER
Reviewed case with Dr. Paris.  Agree it will be difficult to obtain PET scan. With normal CT scan and normal CBCd    Plan:   Flow cytometry, SPEP and quantitative immunoglobulins (next port flush)  CBCd, CMP, and LDH quarterly (will start with February port flush)

## 2024-01-09 ENCOUNTER — INFUSION (OUTPATIENT)
Dept: HEMATOLOGY/ONCOLOGY | Facility: CLINIC | Age: 64
End: 2024-01-09
Payer: COMMERCIAL

## 2024-01-09 DIAGNOSIS — C91.10 CHRONIC LYMPHOCYTIC LEUKEMIA (MULTI): ICD-10-CM

## 2024-01-09 PROCEDURE — 96523 IRRIG DRUG DELIVERY DEVICE: CPT

## 2024-01-09 NOTE — SIGNIFICANT EVENT
Unable to obtain blood from port, pt refuses activase and peripheral draw, requesting port be replaced, CASI Muller CNP notified

## 2024-01-10 ENCOUNTER — APPOINTMENT (OUTPATIENT)
Dept: RHEUMATOLOGY | Facility: CLINIC | Age: 64
End: 2024-01-10
Payer: COMMERCIAL

## 2024-01-11 ENCOUNTER — APPOINTMENT (OUTPATIENT)
Dept: HEMATOLOGY/ONCOLOGY | Facility: CLINIC | Age: 64
End: 2024-01-11
Payer: COMMERCIAL

## 2024-01-15 DIAGNOSIS — C50.919 MALIGNANT NEOPLASM OF FEMALE BREAST, UNSPECIFIED ESTROGEN RECEPTOR STATUS, UNSPECIFIED LATERALITY, UNSPECIFIED SITE OF BREAST (MULTI): ICD-10-CM

## 2024-01-15 DIAGNOSIS — C91.10 CHRONIC LYMPHOCYTIC LEUKEMIA (MULTI): Primary | ICD-10-CM

## 2024-01-15 NOTE — TELEPHONE ENCOUNTER
Visit error   Communicate Risk of Fall with Harm to all staff/Reinforce activity limits and safety measures with patient and family/Tailored Fall Risk Interventions/Visual Cue: Yellow wristband and red socks/Bed in lowest position, wheels locked, appropriate side rails in place/Call bell, personal items and telephone in reach/Instruct patient to call for assistance before getting out of bed or chair/Non-slip footwear when patient is out of bed/[place] to call system/Physically safe environment - no spills, clutter or unnecessary equipment/Purposeful Proactive Rounding/Room/bathroom lighting operational, light cord in reach

## 2024-01-22 ENCOUNTER — CLINICAL SUPPORT (OUTPATIENT)
Dept: PRIMARY CARE | Facility: CLINIC | Age: 64
End: 2024-01-22
Payer: COMMERCIAL

## 2024-01-22 DIAGNOSIS — E53.8 B12 DEFICIENCY: ICD-10-CM

## 2024-01-22 DIAGNOSIS — E53.8 VITAMIN B12 DEFICIENCY: ICD-10-CM

## 2024-01-22 PROCEDURE — 2500000004 HC RX 250 GENERAL PHARMACY W/ HCPCS (ALT 636 FOR OP/ED): Performed by: INTERNAL MEDICINE

## 2024-01-22 PROCEDURE — 96372 THER/PROPH/DIAG INJ SC/IM: CPT | Performed by: INTERNAL MEDICINE

## 2024-01-22 RX ORDER — CYANOCOBALAMIN 1000 UG/ML
1000 INJECTION, SOLUTION INTRAMUSCULAR; SUBCUTANEOUS ONCE
Status: COMPLETED | OUTPATIENT
Start: 2024-01-22 | End: 2024-01-22

## 2024-01-22 RX ADMIN — CYANOCOBALAMIN 1000 MCG: 1000 INJECTION, SOLUTION INTRAMUSCULAR at 10:01

## 2024-01-25 ENCOUNTER — PRE-ADMISSION TESTING (OUTPATIENT)
Dept: PREADMISSION TESTING | Facility: HOSPITAL | Age: 64
End: 2024-01-25
Payer: COMMERCIAL

## 2024-01-25 VITALS
SYSTOLIC BLOOD PRESSURE: 155 MMHG | WEIGHT: 112.66 LBS | HEART RATE: 113 BPM | BODY MASS INDEX: 18.11 KG/M2 | RESPIRATION RATE: 16 BRPM | HEIGHT: 66 IN | DIASTOLIC BLOOD PRESSURE: 85 MMHG | TEMPERATURE: 97.9 F

## 2024-01-25 PROCEDURE — 99203 OFFICE O/P NEW LOW 30 MIN: CPT | Performed by: PHYSICIAN ASSISTANT

## 2024-01-25 RX ORDER — ACETAMINOPHEN 500 MG
1000 TABLET ORAL EVERY 6 HOURS PRN
COMMUNITY

## 2024-01-25 ASSESSMENT — DUKE ACTIVITY SCORE INDEX (DASI)
CAN YOU HAVE SEXUAL RELATIONS: YES
CAN YOU WALK A BLOCK OR TWO ON LEVEL GROUND: YES
CAN YOU RUN A SHORT DISTANCE: NO
CAN YOU TAKE CARE OF YOURSELF (EAT, DRESS, BATHE, OR USE TOILET): YES
CAN YOU CLIMB A FLIGHT OF STAIRS OR WALK UP A HILL: YES
TOTAL_SCORE: 42.7
CAN YOU DO HEAVY WORK AROUND THE HOUSE LIKE SCRUBBING FLOORS OR LIFTING AND MOVING HEAVY FURNITURE: YES
CAN YOU PARTICIPATE IN MODERATE RECREATIONAL ACTIVITIES LIKE GOLF, BOWLING, DANCING, DOUBLES TENNIS OR THROWING A BASEBALL OR FOOTBALL: YES
CAN YOU DO YARD WORK LIKE RAKING LEAVES, WEEDING OR PUSHING A MOWER: YES
DASI METS SCORE: 8
CAN YOU PARTICIPATE IN STRENOUS SPORTS LIKE SWIMMING, SINGLES TENNIS, FOOTBALL, BASKETBALL, OR SKIING: NO
CAN YOU WALK INDOORS, SUCH AS AROUND YOUR HOUSE: YES
CAN YOU DO MODERATE WORK AROUND THE HOUSE LIKE VACUUMING, SWEEPING FLOORS OR CARRYING GROCERIES: YES
CAN YOU DO LIGHT WORK AROUND THE HOUSE LIKE DUSTING OR WASHING DISHES: YES

## 2024-01-25 ASSESSMENT — CHADS2 SCORE
PRIOR STROKE OR TIA OR THROMBOEMBOLISM: NO
CHF: NO
CHADS2 SCORE: 0
HYPERTENSION: NO
DIABETES: NO
AGE GREATER THAN OR EQUAL TO 75: NO

## 2024-01-25 ASSESSMENT — PAIN - FUNCTIONAL ASSESSMENT: PAIN_FUNCTIONAL_ASSESSMENT: 0-10

## 2024-01-25 ASSESSMENT — PAIN SCALES - GENERAL: PAINLEVEL_OUTOF10: 4

## 2024-01-25 ASSESSMENT — LIFESTYLE VARIABLES: SMOKING_STATUS: SMOKER

## 2024-01-25 ASSESSMENT — PAIN DESCRIPTION - DESCRIPTORS: DESCRIPTORS: ACHING;THROBBING

## 2024-01-25 NOTE — PROGRESS NOTES
Patient: Erin Ramachandran    [id number]  : 1960 -- AGE 63 y.o.    Provider: CASEY Patel     Location [facility]   Service Date: 2024         Department of Medicine  Division of Pulmonary, Critical Care, and Sleep Medicine         [facility]  Follow Up Visit Note      HISTORY OF PRESENT ILLNESS     PCP: Dr. Teresa Knox    HISTORY OF PRESENT ILLNESS   Erin Ramachandran is a 63 y.o. female who presents to a [facility] for a follow up visit with concerns of COPD.   I have independently interviewed and examined the patient in the office and reviewed available records.    DATE OF LAST VISIT: 2023    Current History  Patient formerly seen by my colleague Agustina West CNP on 2023.  Patient transferring care to myself due to location convenience.  She had completed a PFT study on 2023 which showed Gold 2 moderate COPD.  She was started on Stiolto as well as albuterol HFA as needed.  Also was advised Claritin daily for allergic rhinitis.  Presents for follow-up today for these prior med changes.    On today's visit, She reports that she stopped using her Stiolto inhaler about 2 to 3 weeks ago.  She tried using it daily for about 2-1/2 to 3 weeks and states that although it did somewhat help her breathing, she did start developing visual changes causing a pain behind her eyes, vision becoming more blurry and having halos in her vision.  Denies a history of glaucoma.  She is currently established with an ophthalmologist; last seen a number of months ago.  Encouraged her to call her eye doctor and get an appointment for evaluation.  We discussed that the LAMA component and Stiolto could be causing some of the symptoms; however, despite being off of Stiolto for a number of weeks the symptoms still persist.  Discussed switching her inhaler class altogether to remove the LAMA component.  Will trial an  ICS/LABA instead.  She denies shortness of breath at rest but does have dyspnea on exertion only on more extensive exertional activity.  Can walk up a flight of stairs without issue.  Reports a chronic cough which can produce an intermittent white mucus.  She does also experience intermittent wheezing; typically more weather dependent or if she is around strong fumes.  She denies GERD.  Denies chest pain or palpitations.  Continues to smoke 0.25 PPD; not currently interested in quitting.    CAT today: 20    Prior Visits & History   Pulmonary Note from Agustina West CNP  12/18/23: Ms. Ramachandran is a 63 year old  female (current smoker, ~23 pack year history) with a PMHx of R breast cancer dx 2002 s/p chemo/radiation, lumpectomy & LN dissection; L breast cancer dx 2005 s/p bilateral mastectomy, chemo, LN dissection; CLL/SLL dx 2012. Here for initial evaluation for shortness of breath.       HPI: Patient reports that she is here as recommended by her PCP after she had breathing tests for her SOB. Per patient she has had ongoing shortness of breath with exertion for several years that has progressively gotten worse. She notes her SOB is worse with weather changes and certain chemicals/fumes (wood stain, bleach). She has nasal congestion and post-nasal drip. Has intermittent cough, usually non-productive but occasionally clear sputum production. She has occasional wheezing. She has never used inhalers. She denies acid reflux, chest pain, fever or chills. Denies appetite changes or unexpected weight changes. She denies BLE edema or orthopnea. Has chronic headaches, denies recent changes in frequency or severity.   CAT today 20     Previous pulmonary history:   She has no history of recurrent infections, or lung disease as a child.  She had no previous lung hx, never on oxygen or inhaler therapy.      Inhalers/nebulized medications: none      Hospitalization History: She has not been hospitalized over the last year for  breathing related problem.     Sleep history:  Snores. Denies apnea, feeling tired during the day or taking naps during the day.       PMHx: R breast cancer dx 2002 s/p chemo/radiation, lumpectomy & LN dissection; L breast cancer dx 2005 s/p bilateral mastectomy, chemo, LN dissection; CLL/SLL dx 2012  Surg Hx: bilateral mastectomy     Social Hx:  -smoking: current smoker, 1 PPD x 20 years, cut back to 1/4 PPD about 10 years ago (~23 pack year history)  -ETOH: occasional  -illicit drugs: occasional marijuana (smoking & gummies)  -occupation:   -exposures: +known asbestos exposure; denies known silica, beryllium, molds, dusts, chemicals/fumes     Assessment/Plan  Ms. Ramachandran is a 63 year old  female (current smoker, ~23 pack year history) with a PMHx of R breast cancer dx 2002 s/p chemo/radiation, lumpectomy & LN dissection; L breast cancer dx 2005 s/p bilateral mastectomy, chemo, LN dissection; CLL/SLL dx 2012. Here for initial evaluation for shortness of breath.      1) COPD (GOLD 2) - PFT 11/2023 FEV1 62%, hyperinflation with airtrapping, DLCO normal; current smoker; +asbestos exposure  -start stioloto 2 puffs once daily  -start albuterol PRN  -avoid triggers     2) Allergic rhinitis - nasal congestion, post-nasal drip  -start claritin once daily     3) Cigarette nicotine dependence - current smoker, ~23 pack year history; 1 PPD x 20 years, cut back to about 1/4 PPD 10 years ago; CT C/A/P completed 12/5/23 by oncology, no obvious nodules   -# Smoking cessation: Patient is currently smoking: >5 minutes smoking cessation counseling   - offered pharmacologic options to assist with quitting (discussed nicotine patches & lozenges or gum but she declined; advised is available OTC or she can call if changes her mind)      Patient would like to follow-up with Faraz Kaur in [place] d/t location proximity to home going forward.   REVIEW OF SYSTEMS     REVIEW OF SYSTEMS  Review of Systems    Constitutional:  Positive for fatigue. Negative for activity change, appetite change, chills, fever and unexpected weight change.   HENT:  Positive for rhinorrhea and sinus pressure. Negative for congestion, postnasal drip, sinus pain, sneezing, sore throat, trouble swallowing and voice change.    Eyes:  Positive for redness, itching and visual disturbance.   Respiratory:  Positive for cough and shortness of breath. Negative for chest tightness, wheezing and stridor.    Cardiovascular:  Negative for chest pain, palpitations and leg swelling.        Denies orthopnea   Gastrointestinal:  Negative for abdominal pain, diarrhea, nausea and vomiting.        Denies acid reflux   Musculoskeletal:  Negative for arthralgias, back pain, joint swelling and myalgias.   Skin:  Negative for rash.   Allergic/Immunologic: Negative for immunocompromised state.   Neurological:  Positive for dizziness, weakness and headaches. Negative for tremors.   Hematological:  Does not bruise/bleed easily.   Psychiatric/Behavioral:  Negative for agitation and sleep disturbance. The patient is nervous/anxious.         Denies depression   All other systems reviewed and are negative.      ALLERGIES AND MEDICATIONS     ALLERGIES  Allergies   Allergen Reactions    Azithromycin Rash and Other     stomach cramping    Penicillins Hives and Rash    Denosumab Other and Unknown     Marked Jaw and bone pain    Gabapentin Hives and Unknown    Magnesium Sulfate Unknown    Zoledronic Acid-Mannitol-Water Other and Unknown    Adhesive Tape-Silicones Itching and Rash    Other Itching, Swelling and Rash     Metals (As Environmental Allergen)    Sulfa (Sulfonamide Antibiotics) Swelling       MEDICATIONS  Current Outpatient Medications   Medication Sig Dispense Refill    acetaminophen (Tylenol) 500 mg tablet Take 2 tablets (1,000 mg) by mouth every 6 hours if needed for mild pain (1 - 3).      fluocinonide 0.05 % cream Apply topically if needed.      hydrOXYzine  pamoate (Vistaril) 25 mg capsule TAKE ONE CAPSULE BY MOUTH EVERY 8 HOURS AS NEEDED 90 capsule 3    pregabalin (Lyrica) 50 mg capsule Take 1 capsule (50 mg) by mouth 3 times a day as needed (PAIN).      albuterol 90 mcg/actuation inhaler Inhale 2 puffs every 4 hours if needed for wheezing or shortness of breath. (Patient not taking: Reported on 1/26/2024) 18 g 11    loratadine (Claritin) 10 mg tablet Take 1 tablet (10 mg) by mouth once daily. (Patient taking differently: Take 1 tablet (10 mg) by mouth once daily as needed for allergies.) 30 tablet 3    tiotropium-olodateroL (Stiolto Respimat) 2.5-2.5 mcg/actuation mist inhaler Inhale 2 Inhalations once daily. (Patient not taking: Reported on 1/25/2024) 4 g 3     No current facility-administered medications for this visit.       PAST HISTORY     PAST MEDICAL HISTORY  She  has a past medical history of Anxiety, Awareness under anesthesia, Breast cancer (CMS/HCC), Chronic lymphocytic leukemia (CMS/HCC), COPD (chronic obstructive pulmonary disease) (CMS/[facility]), Fibromyalgia, and Neuropathy.    PAST SURGICAL HISTORY  Past Surgical History:   Procedure Laterality Date    BREAST LUMPECTOMY Right     COLONOSCOPY      HYSTERECTOMY  11/11/2013    With BSO    MASTECTOMY Bilateral 11/11/2013    Breast Surgery Mastectomy    MEDIPORT      TONSILLECTOMY  11/11/2013    Tonsillectomy    US GUIDED THYROID BIOPSY  02/20/2012    US GUIDED THYROID BIOPSY [facility]       IMMUNIZATION HISTORY  Immunization History   Administered Date(s) Administered    Pfizer Purple Cap SARS-CoV-2 05/06/2021       SOCIAL HISTORY  She  reports that she has been smoking cigarettes. She started smoking about 51 years ago. She has a 12.50 pack-year smoking history. She has never used smokeless tobacco. She reports current alcohol use of about 3.0 standard drinks of alcohol per week. She reports current drug use. Frequency: 1.00 time per week. Drug: Marijuana.       FAMILY HISTORY  Family History  "  Problem Relation Name Age of Onset    Osteoporosis Mother      Ovarian cancer Sister      Arthritis Other FamHx        PHYSICAL EXAM     VITAL SIGNS: BP (!) 178/104 (BP Location: Left leg)   Pulse 98   Ht 1.676 m (5' 6\")   Wt 49.9 kg (110 lb)   SpO2 96%   BMI 17.75 kg/m²      PREVIOUS WEIGHTS:  Wt Readings from Last 3 Encounters:   01/26/24 49.9 kg (110 lb)   01/25/24 51.1 kg (112 lb 10.5 oz)   12/18/23 48.1 kg (106 lb)       Physical Exam  Vitals reviewed.   Constitutional:       General: She is not in acute distress.     Appearance: Normal appearance. She is not ill-appearing or toxic-appearing.   HENT:      Head: Normocephalic.      Nose: No rhinorrhea.   Cardiovascular:      Rate and Rhythm: Normal rate and regular rhythm.      Heart sounds: Normal heart sounds.   Pulmonary:      Effort: Pulmonary effort is normal. No respiratory distress.      Breath sounds: Normal breath sounds. No stridor.   Abdominal:      General: Abdomen is flat.   Musculoskeletal:         General: Normal range of motion.      Right lower leg: No edema.      Left lower leg: No edema.   Skin:     General: Skin is warm and dry.      Nails: There is no clubbing.   Neurological:      General: No focal deficit present.      Mental Status: She is alert and oriented to person, place, and time.   Psychiatric:         Mood and Affect: Mood normal.         Behavior: Behavior normal.         Judgment: Judgment normal.           RESULTS/DATA     Pulmonary Function Test Results   PFT:  -11/6/23: moderate obstructive ventilator defect with borderline BD response (FEV1 62%), hyperinflation with air trapping, DLCO normal     Chest Radiograph     XR chest 2 views     Narrative  PROCEDURE:         CHEST 2 VIEW - AXR  0020  REASON FOR EXAM: sob, cough    RESULT: MRN: [id number]  Patient Name: ELIF COLON    STUDY:  CHEST 2 VIEW; 8/14/2023 12:06 pm    INDICATION:  sob, cough.    COMPARISON:  20 November 2019.    ACCESSION " NUMBER(S):  [id number]    ORDERING CLINICIAN:  GWEN SAVAGE    TECHNIQUE:  2 views of the chest were performed.    FINDINGS:  The lungs are adequately inflated without air space disease or effusion.  15.5 mm unchanged pulmonary nodule left upper lobe separate more vague 25 mm  density with overlying anterior rib projecting over the left upper lobe just  caudal to the 1st nodule. These appear to be pleural nodule seen on CT 15  May 2013 in the anterior left hemithorax. These are not significantly  changed in size compared to previous. There is blunting of the bilateral  costophrenic angles. No focal airspace disease or effusion. No adenopathy.  Tunneled catheter terminates in the low SVC.    Impression  No acute cardiopulmonary disease. The examination is not significantly  changed compared to previous including nodular densities that appear to be  pleural-based in the left hemithorax. No focal airspace disease. No  effusion. No findings to suggest fluid overload.  Dictation workstation:   [id number]    Original Interpreting Physician:   CONSTANCE COURTNEY MD  Original Transcribed by/Date: MMODAL Aug 14 2023 11:53A  Original Electronically Signed by/Date: CONSTANCE COURTNEY MD Aug 14 2023 12:38P    Addendum Interpreting Physician:  Addendum Transcribed by/Date: NO ADDENDUM  Addendum Electronically Signed by/Date:      Chest CT Scan     No results found for this or any previous visit from the past 365 days.      Echocardiogram & Cardiac Studies     No results found for this or any previous visit from the past 365 days.       Labwork & Pathology   Complete Blood Count  Lab Results   Component Value Date    WBC 6.0 11/28/2023    HGB 13.4 11/28/2023    HCT 40.2 11/28/2023    MCV 90 11/28/2023     11/28/2023       Peripheral Eosinophil Count:   Eosinophils Absolute   Date Value   11/28/2023 0.07 x10*3/uL   11/17/2022 0.14 x10E9/L   10/06/2022 0.14 K/UL   10/06/2021 0.15 K/UL       Metabolic Parameters  Sodium   Date/Time  "Value Ref Range Status   11/28/2023 09:11  (L) 136 - 145 mmol/L Final     Potassium   Date/Time Value Ref Range Status   11/28/2023 09:11 AM 3.8 3.5 - 5.3 mmol/L Final     Chloride   Date/Time Value Ref Range Status   11/28/2023 09:11 AM 99 98 - 107 mmol/L Final     Bicarbonate   Date/Time Value Ref Range Status   11/28/2023 09:11 AM 26 21 - 32 mmol/L Final     Anion Gap   Date/Time Value Ref Range Status   11/28/2023 09:11 AM 12 10 - 20 mmol/L Final     Urea Nitrogen   Date/Time Value Ref Range Status   11/28/2023 09:11 AM 9 6 - 23 mg/dL Final     Creatinine   Date/Time Value Ref Range Status   11/28/2023 09:11 AM 0.68 0.50 - 1.05 mg/dL Final     GFR Female   Date/Time Value Ref Range Status   11/17/2022 01:23 PM >90 >90 mL/min/1.73m2 Final     Comment:      CALCULATIONS OF ESTIMATED GFR ARE PERFORMED   USING THE 2021 CKD-EPI STUDY REFIT EQUATION   WITHOUT THE RACE VARIABLE FOR THE IDMS-TRACEABLE   CREATININE METHODS.    https://jasn.asnjournals.org/content/early/2021/09/22/ASN.9041536447       Glucose   Date/Time Value Ref Range Status   11/28/2023 09:11  (H) 74 - 99 mg/dL Final     Calcium   Date/Time Value Ref Range Status   11/28/2023 09:11 AM 8.8 8.6 - 10.3 mg/dL Final     AST   Date/Time Value Ref Range Status   11/28/2023 09:11 AM 16 9 - 39 U/L Final     ALT   Date/Time Value Ref Range Status   11/28/2023 09:11 AM 15 7 - 45 U/L Final     Comment:     Patients treated with Sulfasalazine may generate falsely decreased results for ALT.       Coagulation Parameters  Protime   Date/Time Value Ref Range Status   04/11/2023 12:41 PM 10.4 9.3 - 12.7 SEC Final     INR   Date/Time Value Ref Range Status   04/11/2023 12:41 PM 1.0 0.86 - 1.16 Final     Comment:     INR Therapeutic Range: 2.0-3.5       Serum Immunoglobulin E:    No results found for: \"IGE\"     Bronchoscopy & Sputum Cultures       ASSESSMENT/PLAN     Ms. Ramachandran is a 63 y.o. female; was referred to the [facility] " Clinic for follow up of COPD.    Problem List and Orders  Diagnoses and all orders for this visit:  Chronic obstructive pulmonary disease, unspecified COPD type (CMS/[facility])  -     fluticasone furoate-vilanteroL (Breo Ellipta) 100-25 mcg/dose inhaler; Inhale 1 puff once daily. Rinse mouth with water after use to reduce aftertaste and incidence of candidiasis. Do not swallow.  Allergic rhinitis, unspecified seasonality, unspecified trigger  Cigarette nicotine dependence without complication      Assessment and Plan / Recommendations:    COPD: PFT from 11/2024 showing GOLD 2 Moderate obstruction with borderline bronchodilator response, air trapping and preserved DLCO.  She was trialed on Stiolto for daily management; felt like her breathing did improve while on it however started having side effects of her vision becoming more blurry and having increased halos. No known hx of glaucoma.  -Stop Stiolto (there is concern that the LAMA component could be causing some of her visual changes.  Despite being off of Stiolto for the past 3 weeks she continues to have the symptoms)  -She is currently established with an ophthalmologist; advised that she contact their office for an exam  -Start Breo 100; 1 inhalation once a day.  Rinse mouth after use. We will see how she responds to a ICS/LABA.  -Continue Albuterol HFA PRN    2. Chronic Rhinitis: nasal congestion, post-nasal drip  -Continue Claritin once daily/PRN     3) Cigarette nicotine dependence - current smoker, ~23 pack year history; 1 PPD x 20 years, cut back to about 1/4 PPD 10 years ago; CT C/A/P completed 12/5/23 by oncology, no obvious nodules   ->3 minutes spent on smoking cessation  - Offered pharmacologic options to assist with quitting (discussed nicotine patches & lozenges or gum but she declined; advised is available OTC or she can call if changes her mind)     RTC 2 months

## 2024-01-25 NOTE — CPM/PAT H&P
CPM/PAT Evaluation       Name: Erin Ramachandran (Erin Ramachandran)  /Age: 1960/63 y.o.     In-Person       Chief Complaint: Mediport not working    HPI 63-year-old female chronic lymphocytic leukemia and remote history of bilateral breast cancer.  Patient states she had Mediport placed in 2023 for blood draws.  She states Mediport has not worked since placed.  Is scheduled for Mediport exchange by interventional radiology 2024    Past Medical History:   Diagnosis Date    Anxiety     Awareness under anesthesia     Breast cancer (CMS/HCC)     Bilateral    Chronic lymphocytic leukemia (CMS/HCC)     COPD (chronic obstructive pulmonary disease) (CMS/HCC)     Fibromyalgia        Past Surgical History:   Procedure Laterality Date    BREAST LUMPECTOMY Right     COLONOSCOPY      HYSTERECTOMY  2013    With BSO    MASTECTOMY Bilateral 2013    Breast Surgery Mastectomy    MEDIPORT      TONSILLECTOMY  2013    Tonsillectomy    US GUIDED THYROID BIOPSY  2012    US GUIDED THYROID BIOPSY [facility]       Patient  has no history on file for sexual activity.    Family History   Problem Relation Name Age of Onset    Osteoporosis Mother      Ovarian cancer Sister      Arthritis Other FamHx        Allergies   Allergen Reactions    Azithromycin Rash and Other     stomach cramping    Penicillins Hives and Rash    Denosumab Other and Unknown     Marked Jaw and bone pain    Gabapentin Hives and Unknown    Magnesium Sulfate Unknown    Zoledronic Acid-Mannitol-Water Other and Unknown    Adhesive Tape-Silicones Itching and Rash    Other Itching, Swelling and Rash     Metals (As Environmental Allergen)    Sulfa (Sulfonamide Antibiotics) Swelling       Current Outpatient Medications   Medication Instructions    acetaminophen (TYLENOL) 1,000 mg, oral, Every 6 hours PRN    albuterol 90 mcg/actuation inhaler 2 puffs, inhalation, Every 4 hours PRN    fluocinonide 0.05 % cream Topical, As needed     "hydrOXYzine pamoate (VISTARIL) 25 mg, oral, Every 8 hours PRN    loratadine (CLARITIN) 10 mg, oral, Daily    pregabalin (LYRICA) 50 mg, oral, 3 times daily PRN    tiotropium-olodateroL (Stiolto Respimat) 2.5-2.5 mcg/actuation mist inhaler 2 Inhalations, inhalation, Daily     Review of Systems   All other systems reviewed and are negative.       Physical Exam  Vitals reviewed. Physical exam within normal limits.   Constitutional:       Appearance: Normal appearance.   HENT:      Head: Normocephalic and atraumatic.      Comments: Glasses are present     Mouth/Throat:      Mouth: Mucous membranes are moist.   Eyes:      Extraocular Movements: Extraocular movements intact.      Pupils: Pupils are equal, round, and reactive to light.   Neck:      Vascular: No carotid bruit.   Cardiovascular:      Rate and Rhythm: Normal rate and regular rhythm.      Pulses: Normal pulses.      Heart sounds: Normal heart sounds.   Pulmonary:      Effort: Pulmonary effort is normal.      Breath sounds: Normal breath sounds.   Abdominal:      General: Bowel sounds are normal.      Palpations: Abdomen is soft.   Musculoskeletal:         General: Normal range of motion.      Cervical back: Normal range of motion.   Lymphadenopathy:      Cervical: No cervical adenopathy.   Skin:     General: Skin is warm and dry.      Comments: Right chest Mediport intact   Neurological:      Mental Status: She is alert.          PAT AIRWAY:   Airway:     Mallampati::  II    Neck ROM::  Full   Upper bridge      Vitals  Heart Rate:  [113]   Temp:  [36.6 °C (97.9 °F)]   Resp:  [16]   BP: (155)/(85)   Height:  [167.6 cm (5' 6\")]   Weight:  [51.1 kg (112 lb 10.5 oz)]        DASI Risk Score      Flowsheet Row Most Recent Value   DASI SCORE 42.7   METS Score (Will be calculated only when all the questions are answered) 8          Caprini DVT Assessment      Flowsheet Row Most Recent Value   DVT Score 17   Current Status COPD, Central venous access, Swollen legs, " Minor surgery planned, Present cancer or chemotherapy   History Prior major surgery, COPD, Previous malignancy, Family history of DVT/PE   Age 60-75 years   BMI 30 or less          Modified Frailty Index    No data to display       CHADS2 Stroke Risk  Current as of 44 minutes ago        N/A 3 - 100%: High Risk   2 - 3%: Medium Risk   0 - 2%: Low Risk     Last Change: N/A          This score determines the patient's risk of having a stroke if the patient has atrial fibrillation.        This score is not applicable to this patient. Components are not calculated.          Revised Cardiac Risk Index      Flowsheet Row Most Recent Value   Revised Cardiac Risk Calculator 0          Apfel Simplified Score      Flowsheet Row Most Recent Value   Apfel Simplified Score Calculator 1          Risk Analysis Index Results This Encounter    No data found in the last 1 encounters.       Stop Bang Score      Flowsheet Row Most Recent Value   Do you snore loudly? 0   Do you often feel tired or fatigued after your sleep? 0   Has anyone ever observed you stop breathing in your sleep? 0   Do you have or are you being treated for high blood pressure? 0   Recent BMI (Calculated) 18.2   Is BMI greater than 35 kg/m2? 0=No   Age older than 50 years old? 1=Yes   Is your neck circumference greater than 17 inches (Male) or 16 inches (Female)? 0   Gender - Male 0=No   STOP-BANG Total Score 1            Assessment and Plan:   1.  Chronic lymphocytic leukemia; nonfunctional Mediport   Plan: Mediport exchange 01/30/2024  2.  Daily smoker with history of pack a day for 20+ years, now down to 5    cigarettes per day.  3.  ASA 2

## 2024-01-25 NOTE — PREPROCEDURE INSTRUCTIONS
Medication List            Accurate as of January 25, 2024  4:12 PM. Always use your most recent med list.                acetaminophen 500 mg tablet  Commonly known as: Tylenol     albuterol 90 mcg/actuation inhaler  Inhale 2 puffs every 4 hours if needed for wheezing or shortness of breath.     fluocinonide 0.05 % cream  Commonly known as: Lidex     hydrOXYzine pamoate 25 mg capsule  Commonly known as: Vistaril  TAKE ONE CAPSULE BY MOUTH EVERY 8 HOURS AS NEEDED     loratadine 10 mg tablet  Commonly known as: Claritin  Take 1 tablet (10 mg) by mouth once daily.     pregabalin 50 mg capsule  Commonly known as: Lyrica     Stiolto Respimat 2.5-2.5 mcg/actuation mist inhaler  Generic drug: tiotropium-olodateroL  Inhale 2 Inhalations once daily.                              NPO Instructions:    Do not eat any food after midnight the night before your surgery/procedure.    Additional Instructions:     Review your medication instructions, take indicated medications  Wear  comfortable loose fitting clothing  Do not use moisturizers, creams, lotions or perfume  All jewelry and valuables should be left at home      FOLLOW INSTRUCTIONS PROVIDED TO YOU BY THE RADIOLOGY NURSES    CALL  [facility] AREA 2ND FLOOR WITH ANY QUESTIONS OR CONCERNS

## 2024-01-26 ENCOUNTER — OFFICE VISIT (OUTPATIENT)
Dept: PULMONOLOGY | Facility: CLINIC | Age: 64
End: 2024-01-26
Payer: COMMERCIAL

## 2024-01-26 VITALS
BODY MASS INDEX: 17.68 KG/M2 | DIASTOLIC BLOOD PRESSURE: 104 MMHG | WEIGHT: 110 LBS | SYSTOLIC BLOOD PRESSURE: 178 MMHG | HEIGHT: 66 IN | HEART RATE: 98 BPM | OXYGEN SATURATION: 96 %

## 2024-01-26 DIAGNOSIS — F17.210 CIGARETTE NICOTINE DEPENDENCE WITHOUT COMPLICATION: ICD-10-CM

## 2024-01-26 DIAGNOSIS — J44.9 CHRONIC OBSTRUCTIVE PULMONARY DISEASE, UNSPECIFIED COPD TYPE (MULTI): Primary | ICD-10-CM

## 2024-01-26 DIAGNOSIS — J30.9 ALLERGIC RHINITIS, UNSPECIFIED SEASONALITY, UNSPECIFIED TRIGGER: ICD-10-CM

## 2024-01-26 PROCEDURE — 1160F RVW MEDS BY RX/DR IN RCRD: CPT | Performed by: NURSE PRACTITIONER

## 2024-01-26 PROCEDURE — 1159F MED LIST DOCD IN RCRD: CPT | Performed by: NURSE PRACTITIONER

## 2024-01-26 PROCEDURE — 99406 BEHAV CHNG SMOKING 3-10 MIN: CPT | Performed by: NURSE PRACTITIONER

## 2024-01-26 PROCEDURE — 99215 OFFICE O/P EST HI 40 MIN: CPT | Performed by: NURSE PRACTITIONER

## 2024-01-26 PROCEDURE — 1125F AMNT PAIN NOTED PAIN PRSNT: CPT | Performed by: NURSE PRACTITIONER

## 2024-01-26 RX ORDER — BUDESONIDE AND FORMOTEROL FUMARATE DIHYDRATE 160; 4.5 UG/1; UG/1
2 AEROSOL RESPIRATORY (INHALATION)
Qty: 1 EACH | Refills: 3 | Status: SHIPPED | OUTPATIENT
Start: 2024-01-26 | End: 2024-01-26 | Stop reason: WASHOUT

## 2024-01-26 RX ORDER — FLUTICASONE FUROATE AND VILANTEROL 100; 25 UG/1; UG/1
1 POWDER RESPIRATORY (INHALATION) DAILY
Qty: 1 EACH | Refills: 3 | Status: SHIPPED | OUTPATIENT
Start: 2024-01-26 | End: 2024-03-22 | Stop reason: SINTOL

## 2024-01-26 ASSESSMENT — LIFESTYLE VARIABLES
HOW OFTEN DO YOU HAVE A DRINK CONTAINING ALCOHOL: 2-4 TIMES A MONTH
SKIP TO QUESTIONS 9-10: 0
HOW OFTEN DO YOU HAVE SIX OR MORE DRINKS ON ONE OCCASION: NEVER
HOW MANY STANDARD DRINKS CONTAINING ALCOHOL DO YOU HAVE ON A TYPICAL DAY: 3 OR 4
AUDIT-C TOTAL SCORE: 3

## 2024-01-26 ASSESSMENT — ENCOUNTER SYMPTOMS
CHILLS: 0
BACK PAIN: 0
DIZZINESS: 1
CHEST TIGHTNESS: 0
PALPITATIONS: 0
FEVER: 0
TROUBLE SWALLOWING: 0
ROS GI COMMENTS: DENIES ACID REFLUX
DIARRHEA: 0
EYE REDNESS: 1
RHINORRHEA: 1
SINUS PAIN: 0
SORE THROAT: 0
COUGH: 1
JOINT SWELLING: 0
MYALGIAS: 0
NAUSEA: 0
EYE ITCHING: 1
VOMITING: 0
ABDOMINAL PAIN: 0
ARTHRALGIAS: 0
FATIGUE: 1
AGITATION: 0
SINUS PRESSURE: 1
TREMORS: 0
SHORTNESS OF BREATH: 1
WHEEZING: 0
BRUISES/BLEEDS EASILY: 0
APPETITE CHANGE: 0
NERVOUS/ANXIOUS: 1
ACTIVITY CHANGE: 0
UNEXPECTED WEIGHT CHANGE: 0
SLEEP DISTURBANCE: 0
WEAKNESS: 1
VOICE CHANGE: 0
HEADACHES: 1
STRIDOR: 0

## 2024-01-26 ASSESSMENT — PATIENT HEALTH QUESTIONNAIRE - PHQ9
SUM OF ALL RESPONSES TO PHQ9 QUESTIONS 1 & 2: 0
10. IF YOU CHECKED OFF ANY PROBLEMS, HOW DIFFICULT HAVE THESE PROBLEMS MADE IT FOR YOU TO DO YOUR WORK, TAKE CARE OF THINGS AT HOME, OR GET ALONG WITH OTHER PEOPLE: NOT DIFFICULT AT ALL
2. FEELING DOWN, DEPRESSED OR HOPELESS: NOT AT ALL
1. LITTLE INTEREST OR PLEASURE IN DOING THINGS: NOT AT ALL

## 2024-01-26 ASSESSMENT — PAIN SCALES - GENERAL: PAINLEVEL: 4

## 2024-01-26 NOTE — PATIENT INSTRUCTIONS
"Today we discussed how you have been feeling since your last visit.  I am sorry you have had some side effects from your new medications; we will try to find something new that will work better for you.    -Stop Stiolto  -Start Breo 100; 1 inhalation once a day.  Rinse mouth after use.  This will be your long-acting daily maintenance inhaler. (Please contact my office if you have any difficulty getting this prescription)  -Continue albuterol Inhaler; 1-2 puffs every 4-6 hours as needed for shortness of breath. You can also take this 10-15 minutes prior to exertional activity to help \"prime\" your lungs.  -Continue Claritin 10 mg as needed for chronic runny nose symptoms  -Continue working on cutting back on your smoking.  Please let me know if you ever want to start nicotine replacement therapy for full cessation.  -Please contact your ophthalmologist to get in for an exam; some of your eye symptoms may be related to the prior Stiolto inhaler; however, even though you have been off this inhaler for a few weeks your symptoms are persisting and require further investigation.  -Cigarette/nicotine use is harming your health; and specifically exacerbating the risk of primary lung cancer. Smoking causes 85-90% of all lung cancers.  I encourage your current efforts to stop and recommended that you stop smoking entirely. I recommended community support groups, and use local tobacco cessation hotlines 2-800-Quit-Now (1-709.791.8725). If you are ready to quit, I advise you letting either your primary care provider or myself know so we can discuss smoking cessation techniques/treatments. *E-cigarettes should not replace smoking or be used to help quit smoking.    Thank you for visiting the pulmonary clinic today! It was a pleasure to participate in your care.  Please return to clinic 2 months or sooner if needed.    Faraz Kaur, CNP  My Office Number: [phone]   CT Scheduling: [phone]  PFT/Follow Up Visit " Scheduling: [phone]  My Nurse: JEWEL Ibarra  My : Luz    **For immediate needs such as medication issues/refills, active sick symptoms/medical concerns; I ask that you please call the office and speak to the pulmonary nurse. MyChart messages do not come directly to me. There can sometimes be a delay before I am aware of any messages that were sent. Thank you.**

## 2024-01-29 ENCOUNTER — TELEPHONE (OUTPATIENT)
Dept: CARDIOLOGY | Facility: HOSPITAL | Age: 64
End: 2024-01-29

## 2024-01-30 ENCOUNTER — HOSPITAL ENCOUNTER (OUTPATIENT)
Dept: CARDIOLOGY | Facility: HOSPITAL | Age: 64
Discharge: HOME | End: 2024-01-30
Payer: COMMERCIAL

## 2024-01-30 ENCOUNTER — APPOINTMENT (OUTPATIENT)
Dept: CARDIOLOGY | Facility: HOSPITAL | Age: 64
End: 2024-01-30
Payer: COMMERCIAL

## 2024-01-30 VITALS
TEMPERATURE: 97.7 F | HEART RATE: 91 BPM | OXYGEN SATURATION: 98 % | DIASTOLIC BLOOD PRESSURE: 84 MMHG | RESPIRATION RATE: 16 BRPM | SYSTOLIC BLOOD PRESSURE: 141 MMHG

## 2024-01-30 DIAGNOSIS — C50.919 MALIGNANT NEOPLASM OF FEMALE BREAST, UNSPECIFIED ESTROGEN RECEPTOR STATUS, UNSPECIFIED LATERALITY, UNSPECIFIED SITE OF BREAST (MULTI): ICD-10-CM

## 2024-01-30 DIAGNOSIS — C91.10 CHRONIC LYMPHOCYTIC LEUKEMIA (MULTI): ICD-10-CM

## 2024-01-30 PROCEDURE — 7100000009 HC PHASE TWO TIME - INITIAL BASE CHARGE

## 2024-01-30 PROCEDURE — 99152 MOD SED SAME PHYS/QHP 5/>YRS: CPT

## 2024-01-30 PROCEDURE — 36561 INSERT TUNNELED CV CATH: CPT

## 2024-01-30 PROCEDURE — 99153 MOD SED SAME PHYS/QHP EA: CPT

## 2024-01-30 PROCEDURE — 77001 FLUOROGUIDE FOR VEIN DEVICE: CPT

## 2024-01-30 PROCEDURE — 7100000010 HC PHASE TWO TIME - EACH INCREMENTAL 1 MINUTE

## 2024-01-30 PROCEDURE — 76937 US GUIDE VASCULAR ACCESS: CPT

## 2024-01-30 PROCEDURE — C1894 INTRO/SHEATH, NON-LASER: HCPCS

## 2024-01-30 PROCEDURE — 2780000003 HC OR 278 NO HCPCS

## 2024-01-30 PROCEDURE — 2500000004 HC RX 250 GENERAL PHARMACY W/ HCPCS (ALT 636 FOR OP/ED): Performed by: RADIOLOGY

## 2024-01-30 PROCEDURE — 2500000005 HC RX 250 GENERAL PHARMACY W/O HCPCS: Performed by: RADIOLOGY

## 2024-01-30 PROCEDURE — 36590 REMOVAL TUNNELED CV CATH: CPT

## 2024-01-30 PROCEDURE — 2720000007 HC OR 272 NO HCPCS

## 2024-01-30 PROCEDURE — C1788 PORT, INDWELLING, IMP: HCPCS

## 2024-01-30 RX ORDER — HEPARIN 100 UNIT/ML
SYRINGE INTRAVENOUS AS NEEDED
Status: DISCONTINUED | OUTPATIENT
Start: 2024-01-30 | End: 2024-01-31 | Stop reason: HOSPADM

## 2024-01-30 RX ORDER — FENTANYL CITRATE 50 UG/ML
INJECTION, SOLUTION INTRAMUSCULAR; INTRAVENOUS AS NEEDED
Status: DISCONTINUED | OUTPATIENT
Start: 2024-01-30 | End: 2024-01-31 | Stop reason: HOSPADM

## 2024-01-30 RX ORDER — LIDOCAINE HYDROCHLORIDE 10 MG/ML
INJECTION, SOLUTION EPIDURAL; INFILTRATION; INTRACAUDAL; PERINEURAL AS NEEDED
Status: DISCONTINUED | OUTPATIENT
Start: 2024-01-30 | End: 2024-01-31 | Stop reason: HOSPADM

## 2024-01-30 RX ORDER — MIDAZOLAM HYDROCHLORIDE 1 MG/ML
INJECTION INTRAMUSCULAR; INTRAVENOUS AS NEEDED
Status: DISCONTINUED | OUTPATIENT
Start: 2024-01-30 | End: 2024-01-31 | Stop reason: HOSPADM

## 2024-01-30 RX ADMIN — Medication 3 L/MIN: at 13:14

## 2024-01-30 RX ADMIN — FENTANYL CITRATE 50 MCG: 50 INJECTION, SOLUTION INTRAMUSCULAR; INTRAVENOUS at 13:28

## 2024-01-30 RX ADMIN — LIDOCAINE HYDROCHLORIDE 10 ML: 10 INJECTION, SOLUTION EPIDURAL; INFILTRATION; INTRACAUDAL; PERINEURAL at 13:28

## 2024-01-30 RX ADMIN — FENTANYL CITRATE 50 MCG: 50 INJECTION, SOLUTION INTRAMUSCULAR; INTRAVENOUS at 13:41

## 2024-01-30 RX ADMIN — HEPARIN 3 ML: 100 SYRINGE at 13:51

## 2024-01-30 RX ADMIN — MIDAZOLAM HYDROCHLORIDE 2 MG: 1 INJECTION, SOLUTION INTRAMUSCULAR; INTRAVENOUS at 13:27

## 2024-01-30 ASSESSMENT — PAIN - FUNCTIONAL ASSESSMENT
PAIN_FUNCTIONAL_ASSESSMENT: 0-10

## 2024-01-30 ASSESSMENT — PAIN SCALES - GENERAL
PAINLEVEL_OUTOF10: 0 - NO PAIN

## 2024-02-05 ENCOUNTER — APPOINTMENT (OUTPATIENT)
Dept: PULMONOLOGY | Facility: CLINIC | Age: 64
End: 2024-02-05
Payer: COMMERCIAL

## 2024-02-07 ENCOUNTER — HOSPITAL ENCOUNTER (OUTPATIENT)
Dept: RADIOLOGY | Facility: HOSPITAL | Age: 64
Discharge: HOME | End: 2024-02-07
Payer: COMMERCIAL

## 2024-02-07 ENCOUNTER — PHARMACY VISIT (OUTPATIENT)
Dept: PHARMACY | Facility: CLINIC | Age: 64
End: 2024-02-07
Payer: COMMERCIAL

## 2024-02-07 ENCOUNTER — TELEPHONE (OUTPATIENT)
Dept: HEMATOLOGY/ONCOLOGY | Facility: CLINIC | Age: 64
End: 2024-02-07
Payer: COMMERCIAL

## 2024-02-07 DIAGNOSIS — C91.10 CHRONIC LYMPHOCYTIC LEUKEMIA (MULTI): ICD-10-CM

## 2024-02-07 PROCEDURE — RXMED WILLOW AMBULATORY MEDICATION CHARGE

## 2024-02-07 RX ORDER — MUPIROCIN 20 MG/G
OINTMENT TOPICAL
Qty: 22 G | Refills: 0 | OUTPATIENT
Start: 2024-02-07 | End: 2024-03-22 | Stop reason: ALTCHOICE

## 2024-02-07 RX ORDER — SULFAMETHOXAZOLE AND TRIMETHOPRIM 800; 160 MG/1; MG/1
TABLET ORAL
Qty: 20 TABLET | Refills: 0 | OUTPATIENT
Start: 2024-02-07 | End: 2024-03-22 | Stop reason: ALTCHOICE

## 2024-02-07 NOTE — TELEPHONE ENCOUNTER
Per Francisco Muller CNP instruct the patient to call [facility]  [phone],  Spoke to Erin who is in agreement.

## 2024-02-08 ENCOUNTER — OFFICE VISIT (OUTPATIENT)
Dept: RHEUMATOLOGY | Facility: CLINIC | Age: 64
End: 2024-02-08
Payer: COMMERCIAL

## 2024-02-08 VITALS
DIASTOLIC BLOOD PRESSURE: 94 MMHG | SYSTOLIC BLOOD PRESSURE: 160 MMHG | HEIGHT: 66 IN | WEIGHT: 111 LBS | TEMPERATURE: 98 F | BODY MASS INDEX: 17.84 KG/M2 | HEART RATE: 91 BPM

## 2024-02-08 DIAGNOSIS — Z85.3 PERSONAL HISTORY OF MALIGNANT NEOPLASM OF BREAST: ICD-10-CM

## 2024-02-08 DIAGNOSIS — M81.0 AGE-RELATED OSTEOPOROSIS WITHOUT CURRENT PATHOLOGICAL FRACTURE: Primary | ICD-10-CM

## 2024-02-08 PROCEDURE — 1125F AMNT PAIN NOTED PAIN PRSNT: CPT | Performed by: INTERNAL MEDICINE

## 2024-02-08 PROCEDURE — 99204 OFFICE O/P NEW MOD 45 MIN: CPT | Performed by: INTERNAL MEDICINE

## 2024-02-08 PROCEDURE — 1159F MED LIST DOCD IN RCRD: CPT | Performed by: INTERNAL MEDICINE

## 2024-02-08 PROCEDURE — 99214 OFFICE O/P EST MOD 30 MIN: CPT | Mod: GC | Performed by: INTERNAL MEDICINE

## 2024-02-08 PROCEDURE — 1160F RVW MEDS BY RX/DR IN RCRD: CPT | Performed by: INTERNAL MEDICINE

## 2024-02-08 ASSESSMENT — PAIN SCALES - GENERAL: PAINLEVEL: 3

## 2024-02-08 NOTE — PROGRESS NOTES
Subjective   Patient ID: [id number]   Erin Ramachandran is a 63 y.o. female who presents for New Patient Visit (New patient) to discuss osteoporosis.     HPI  She was diagnosed with R breast cancer dx 2002 s/p chemo/radiation, lumpectomy & LN dissection; L breast cancer dx 2005 s/p bilateral mastectomy, chemo, JH and BSO 2006, BRCA 1 positive, CLL/SLL, diagnosed by right submandibular lymph node excisional biopsy May 2012 and is currently on surveillance. She smokes 5 cigarettes/day and drinks alcohol socially.   Her mom had osteoporosis and has fractured her hip.     She says she was diagnosed with osteoporosis around the same time she was diagnosed with breast cancer in 2002. She has tried multiple oral bisphosphonates and developed acid reflux and couldn't tolerate it. In the chart it shows that she has an intolerance to Reclast, she says she developed a flu like illness to it. She also tried prolia but developed joint pains, chest and jaw pain and wasn't able to tolerate it.   She doesn't take calcium or vitamin D because she cannot tolerate the taste. She reports that she has tried capsules and pills.     She broke her L wrist more than 20 years ago after she tripped and fell.     DEXA  FINDINGS:  SPINE L1-L4  Bone Mineral Density: 0.707 g/cm2  T-Score -3.4  Z-Score -1.5  Bone Mineral Density change vs baseline:  -15.9 %  Bone Mineral Density change vs previous: -11.5 %      LEFT FEMUR -TOTAL  Bone Mineral Density: 0.52 g/cm2  T-Score -3.5   Z-Score  -2.2  Bone Mineral Density change vs baseline: -19.2 %  Bone Mineral Density change vs previous: -8.2 %      LEFT FEMUR -NECK  Bone Mineral Density: 0.454 g/cm2  T-Score -3.6  Z-Score -2    She also has fibromyalgia and is on tylenol and lyrica as needed. No morning stiffness.     She denies fever, chills, weight loss, night sweats, chest pain, SOB, N, V, heartburn. No GI/ symptoms. She is currently having an infection at the site of her port and just started  antibiotics. No history of clots.     PMH: R breast cancer dx 2002 s/p chemo/radiation, lumpectomy & LN dissection; L breast cancer dx 2005 s/p bilateral mastectomy, chemo, JH and BSO 2006, BRCA 1 positive, CLL/SLL, diagnosed by right submandibular lymph node excisional biopsy May 2012, COPD, fibromyalgia   FHx: No family history of autoimmune diseases   Social: Smoker 23 pack year       Patient Active Problem List   Diagnosis    Closed fracture of one or more phalanges of foot    Anxiety    BRCA gene mutation test positive    Breast cancer (CMS/HCC)    Carpal tunnel syndrome    Cataract    Chronic lymphocytic leukemia (CMS/HCC)    Corneal abrasion, left    Dry eyes    Fibromyalgia    Other chronic pain    Foot swelling    Hereditary and idiopathic neuropathy, unspecified    Hyperlipidemia    Left foot pain    Low vitamin B12 level    Meibomian gland dysfunction (MGD) of upper and lower eyelid of left eye    Osteoarthritis    Osteoporosis    Personal history of malignant neoplasm of breast    Port-A-Cath in place    Rib pain    Vitamin D deficiency    Wears glasses    Meibomian gland dysfunction (MGD) of upper and lower eyelid of right eye    Bilateral pseudophakia    Chronic obstructive pulmonary disease (CMS/HCC)    Allergic rhinitis        Past Medical History:   Diagnosis Date    Anxiety     Awareness under anesthesia     Breast cancer (CMS/HCC)     Bilateral    Chronic lymphocytic leukemia (CMS/HCC)     COPD (chronic obstructive pulmonary disease) (CMS/HCC)     Fibromyalgia     Neuropathy         Past Surgical History:   Procedure Laterality Date    BREAST LUMPECTOMY Right     COLONOSCOPY      HYSTERECTOMY  11/11/2013    With BSO    MASTECTOMY Bilateral 11/11/2013    Breast Surgery Mastectomy    MEDIPORT      TONSILLECTOMY  11/11/2013    Tonsillectomy    US GUIDED THYROID BIOPSY  02/20/2012    US GUIDED THYROID BIOPSY [facility]        Social History     Socioeconomic History    Marital status:       Spouse name: Not on file    Number of children: Not on file    Years of education: Not on file    Highest education level: Not on file   Occupational History    Not on file   Tobacco Use    Smoking status: Every Day     Packs/day: 0.25     Years: 50.00     Additional pack years: 0.00     Total pack years: 12.50     Types: Cigarettes     Start date: 1973    Smokeless tobacco: Never   Vaping Use    Vaping Use: Never used   Substance and Sexual Activity    Alcohol use: Yes     Alcohol/week: 3.0 standard drinks of alcohol     Types: 3 Cans of beer per week     Comment: social    Drug use: Yes     Frequency: 1.0 times per week     Types: Marijuana     Comment: GUMMIES/OCCASIONAL SMOKE    Sexual activity: Not on file   Other Topics Concern    Not on file   Social History Narrative    Not on file     Social Determinants of Health     Financial Resource Strain: Not on file   Food Insecurity: Not on file   Transportation Needs: Not on file   Physical Activity: Not on file   Stress: Not on file   Social Connections: Not on file   Intimate Partner Violence: Not on file   Housing Stability: Not on file        Allergies   Allergen Reactions    Azithromycin Rash and Other     stomach cramping    Penicillins Hives and Rash    Denosumab Other and Unknown     Marked Jaw and bone pain    Gabapentin Hives and Unknown    Magnesium Sulfate Unknown    Zoledronic Acid-Mannitol-Water Other and Unknown    Adhesive Tape-Silicones Itching and Rash    Other Itching, Swelling and Rash     Metals (As Environmental Allergen)    Sulfa (Sulfonamide Antibiotics) Swelling          Current Outpatient Medications:     acetaminophen (Tylenol) 500 mg tablet, Take 2 tablets (1,000 mg) by mouth every 6 hours if needed for mild pain (1 - 3)., Disp: , Rfl:     albuterol 90 mcg/actuation inhaler, Inhale 2 puffs every 4 hours if needed for wheezing or shortness of breath., Disp: 18 g, Rfl: 11    fluocinonide 0.05 % cream, Apply topically if  needed., Disp: , Rfl:     fluticasone furoate-vilanteroL (Breo Ellipta) 100-25 mcg/dose inhaler, Inhale 1 puff once daily. Rinse mouth with water after use to reduce aftertaste and incidence of candidiasis. Do not swallow., Disp: 1 each, Rfl: 3    hydrOXYzine pamoate (Vistaril) 25 mg capsule, TAKE ONE CAPSULE BY MOUTH EVERY 8 HOURS AS NEEDED, Disp: 90 capsule, Rfl: 3    loratadine (Claritin) 10 mg tablet, Take 1 tablet (10 mg) by mouth once daily. (Patient taking differently: Take 1 tablet (10 mg) by mouth once daily as needed for allergies.), Disp: 30 tablet, Rfl: 3    mupirocin (Bactroban) 2 % ointment, Apply to the affected area three times daily, Disp: 22 g, Rfl: 0    pregabalin (Lyrica) 50 mg capsule, Take 1 capsule (50 mg) by mouth 3 times a day as needed (PAIN)., Disp: , Rfl:     sulfamethoxazole-trimethoprim (Bactrim DS) 800-160 mg tablet, Take 1 tablet by mouth every 12 hours for 10 days, Disp: 20 tablet, Rfl: 0       Objective     Visit Vitals  BP (!) 160/94 (BP Location: Left leg, Patient Position: Sitting)   Pulse 91   Temp 36.7 °C (98 °F)        Physical Exam  General: AAOx3, Cooperative  Head: normocephalic, atraumatic  Eyes: EOMI, conjunctiva clear, sclera white, anicteric  Neuro: CN II-XII grossly intact, no focal deficit  Skin: No rashes, ulcers or photosensitive areas  MSK: Diffuse myofascial tenderness.   Hand/Fingers: No erythema, swelling, tenderness or warmth at DIP, PIP, or MCP joints, FROM grossly. Good hand . No nodules. No deformities   Wrists: No erythema, swelling, warmth or tenderness at wrist, FROM grossly  Elbows: No tenderness, swelling, erythema or warmth at elbows, FROM grossly. No nodules   Shoulders: No swelling, erythema. + crepitus but FROM  Lower Extremities:   Hips: No obvious deformities. normal ROM grossly.   Knees: No tenderness, deformities, swelling, rashes, or warmth, normal ROM grossly.      Lab Results   Component Value Date    WBC 6.0 11/28/2023    HGB 13.4  "11/28/2023    HCT 40.2 11/28/2023    MCV 90 11/28/2023     11/28/2023        Chemistry    Lab Results   Component Value Date/Time     (L) 11/28/2023 0911    K 3.8 11/28/2023 0911    CL 99 11/28/2023 0911    CO2 26 11/28/2023 0911    BUN 9 11/28/2023 0911    CREATININE 0.68 11/28/2023 0911    Lab Results   Component Value Date/Time    CALCIUM 8.8 11/28/2023 0911    ALKPHOS 55 11/28/2023 0911    AST 16 11/28/2023 0911    ALT 15 11/28/2023 0911    BILITOT 0.5 11/28/2023 0911           No results found for: \"CRP\"   Lab Results   Component Value Date    SEDRATE 5 08/02/2019      No results found for: \"CKTOTAL\"  Lab Results   Component Value Date    NEUTROABS 3.79 11/28/2023      No results found for: \"FERRITIN\"   No results found for: \"HEPATOT\", \"HEPAIGM\", \"HEPBCIGM\", \"HEPBCAB\", \"HBEAG\", \"HEPCAB\"   Lab Results   Component Value Date    ALT 15 11/28/2023    AST 16 11/28/2023    ALKPHOS 55 11/28/2023    BILITOT 0.5 11/28/2023      No results found for: \"PPD\"   No results found for: \"URICACID\"   Lab Results   Component Value Date    CALCIUM 8.8 11/28/2023      Lab Results   Component Value Date    SPEP  08/02/2019     (Note)    No definitive M protein is identified on protein   electrophoresis.        No results found for: \"ALBUR\", \"GCJ01BGB\"   .last          IR CVC port placement  Narrative: Interpreted By:  John Turpin,   STUDY:  IR CVC PORT PLACEMENT; 1/30/2024 2:07 pm      INDICATION:  Chronic lymphocytic leukemia, malfunctioning 4th, referred for  removal and placement of a new port.      COMPARISON:  None      ACCESSION NUMBER(S):  [id number]      ORDERING CLINICIAN:  MANASA MENDEZ      TECHNIQUE:  Conscious sedation: 35 minutes  Removal of central venous catheter with port  Ultrasound-guided vascular access  Insertion of tunneled central venous catheter with port  Fluoroscopy time: 0.1 minutes  Images: 2  Dose: 1 mGy air Kerma      FINDINGS:  Informed consent obtained. Patient positioned supine and " connected to  physiologic monitoring. Conscious sedation provided with versed and  fentanyl. Intravenous antibiotic administered for prophylaxis. All  elements of maximal sterile barrier were utilized including cap,  mask, sterile gown, sterile gloves, large sterile drape, hand scrub,  2% chlorhexidine for skin cleaning and sterile ultrasound guidance.      Right upper chest scar incised. Using blunt and sharp dissection as  necessary subcutaneous port and tunneled catheter were removed in  total. Since the patient requested for new port to be placed lower,  this incision was closed with subcutaneous Vicryl stitches and  covered with Dermabond.      Ultrasound of right neck showed patent internal jugular vein. Under  ultrasound guidance, access into the vein established with micro  puncture. A dermatotomy was made few cm below clavicle and  subcutaneous pocket formed with blunt dissection. A 9.5 [place]  catheter was cut to appropriate length, connected to a port and  distal end tunneled from the dermatotomy to the venotomy. Port  implanted in the subcutaneous pocket. Introducer sheath exchanged  over a wire for a peel-away sheath and distal end of tunneled  catheter advanced into central venous system through the peel-away  sheath. Catheter tip positioned near the superior cavo-atrial  junction. Port aspirated and flushed freely, locked with heparin.  Subcutaneous pocket closed with interrupted subcutaneous Vicryl  stitch. Dermatotomy and venotomy covered with Dermabond.      Impression: Removal of tunneled central venous catheter with port  Insertion of new PowerPort MRI implantable Port via right internal  jugular vein.      Signed by: John Turpin 2/5/2024 1:56 PM  Dictation workstation:   [id number]     === 08/14/23 ===    XR CHEST 2 VIEWS    - Impression -  No acute cardiopulmonary disease. The examination is not significantly  changed compared to previous including nodular densities that appear to  be  pleural-based in the left hemithorax. No focal airspace disease. No  effusion. No findings to suggest fluid overload.  Dictation workstation:   [id number]    Original Interpreting Physician:   CONSTANCE COURTNEY MD  Original Transcribed by/Date: MMODAL Aug 14 2023 11:53A  Original Electronically Signed by/Date: CONSTANCE COURTNEY MD Aug 14 2023 12:38P    Addendum Interpreting Physician:  Addendum Transcribed by/Date: NO ADDENDUM  Addendum Electronically Signed by/Date:   === 08/15/19 ===    MRI CERVICAL SPINE WO CONTRAST    - Impression -  Examination is compromised by patient motion.    Reversal of cervical lordosis with degenerative disc space narrowing from  C4-5 through C6-7. There is spondylotic spurring of the endplates from C4-5  through C6-7.    Borderline to mild central canal stenosis at both the C5-6 and C6-7 levels  is seen secondary to broad-based posterior disc osteophyte complex. There is  also bilateral foraminal stenosis at both of these levels secondary to  uncovertebral joint spurring as outlined above.    Mild bulge of the C4-5 disc with minimal bulge of the C3-4 disc.      [id number]    This report has been produced using speech recognition.    Original Interpreting Physician:   REINIER BENNETT M.D.  Original [facility]/Date: [facility]   Aug 16 2019  9:32A  Original Electronically Signed by/Date: REINIER BENNETT M.D. Aug 16 2019  9:32A    Addendum Interpreting Physician:  Addendum Transcribed by/Date: NO ADDENDUM  Addendum Electronically Signed by/Date:   === 11/06/23 ===    DEXA BONE DENSITY AXIAL SKELETON W VFA    - Impression -  DEXA:  According to World Health Organization criteria,  classification is osteoporosis.    Followup recommended in two years or sooner as clinically warranted.    VFA:  NO VERTEBRAL FRACTURES WERE SEEN.    All images and detailed analysis are available on the  Radiology  PACS.    MACRO:  None        Signed by: Compa Cr 11/6/2023 1:08 PM  Dictation workstation:   [id number]        Assessment/Plan   A 63 year old F with a PMH of R breast cancer dx 2002 s/p chemo/radiation, lumpectomy & LN dissection; L breast cancer dx 2005 s/p bilateral mastectomy & LN dissection, chemo, JH and BSO 2006, BRCA 1 positive, CLL/SLL, diagnosed by right submandibular lymph node excisional biopsy May 2012 and is currently on surveillance. She smokes 5 cigarettes/day and drinks alcohol socially is here to discuss osteoporosis.     She has severe osteoporosis with lowest T score -3.6 and hasn't been on osteoporosis treatment for many years. She also has a fragility fracture more than 20 years ago.   She had GERD with oral and flu like symptoms with IV bisphosphonates and bone and jaw pain with prolia.  She has a history of radiation therapy ~30 sessions and would avoid PTH analogues because of risk of osteosarcoma. Wouldn't use raloxifene with her known history of breast cancer.     -Discussed the option of trying IV or oral BP again or prolia since no major contraindication to use. She is not willing to try oral or IV bisphosphonates or prolia again because of prior intolerances.     -The only option that we have left would be romosozumab treatment for 1 year. Discussed risks/benefits with the patient and provided her with patient information sheet. The patient will think it over and get back to me of her decision. Discussed that we could try prolia or bisphosphonates after 1 year of treatment with romosozumab if she's willing to try again.   -She is off calcium and vitamin D. Last vitamin D 44. Encouraged her to try a different formulary/ one she hasn't tried before.   -Counseled her on weight bearing exercises and smoking cessation.     RTC as needed and she will reach out to me once she has made her decision.     Patient seen and discussed with Dr. Rg.   Judith Ewing MD   Rheumatology Fellow PGY-5

## 2024-02-08 NOTE — PROGRESS NOTES
I saw and evaluated the patient. I personally obtained the key and critical portions of the history and physical exam or was physically present for key and critical portions performed by the resident/fellow. I reviewed the resident/fellow's documentation and discussed the patient with the resident/fellow. I agree with the resident/fellow's medical decision making as documented in the note.    Khushi Rg MD

## 2024-02-09 ENCOUNTER — PHARMACY VISIT (OUTPATIENT)
Dept: PHARMACY | Facility: CLINIC | Age: 64
End: 2024-02-09
Payer: COMMERCIAL

## 2024-02-09 PROCEDURE — RXMED WILLOW AMBULATORY MEDICATION CHARGE

## 2024-02-09 RX ORDER — BETAMETHASONE DIPROPIONATE 0.5 MG/G
CREAM TOPICAL 2 TIMES DAILY
Qty: 45 G | Refills: 2 | OUTPATIENT
Start: 2024-02-09 | End: 2024-03-10

## 2024-02-15 ENCOUNTER — TELEPHONE (OUTPATIENT)
Dept: HEMATOLOGY/ONCOLOGY | Facility: CLINIC | Age: 64
End: 2024-02-15
Payer: COMMERCIAL

## 2024-02-15 NOTE — TELEPHONE ENCOUNTER
Lab orders placed per Francisco Muller CNP.  Schedule port flush with lab draw asap.  Thanks Elinor BURTON

## 2024-02-21 DIAGNOSIS — E53.8 B12 DEFICIENCY: ICD-10-CM

## 2024-02-21 RX ORDER — CYANOCOBALAMIN 1000 UG/ML
1000 INJECTION, SOLUTION INTRAMUSCULAR; SUBCUTANEOUS ONCE
Status: COMPLETED | OUTPATIENT
Start: 2024-02-22 | End: 2024-02-22

## 2024-02-22 ENCOUNTER — INFUSION (OUTPATIENT)
Dept: HEMATOLOGY/ONCOLOGY | Facility: CLINIC | Age: 64
End: 2024-02-22
Payer: COMMERCIAL

## 2024-02-22 ENCOUNTER — CLINICAL SUPPORT (OUTPATIENT)
Dept: PRIMARY CARE | Facility: CLINIC | Age: 64
End: 2024-02-22
Payer: COMMERCIAL

## 2024-02-22 DIAGNOSIS — C91.10 CHRONIC LYMPHOCYTIC LEUKEMIA (MULTI): ICD-10-CM

## 2024-02-22 DIAGNOSIS — E53.8 VITAMIN B12 DEFICIENCY: ICD-10-CM

## 2024-02-22 LAB
ALBUMIN SERPL BCP-MCNC: 4 G/DL (ref 3.4–5)
ALP SERPL-CCNC: 55 U/L (ref 33–136)
ALT SERPL W P-5'-P-CCNC: 14 U/L (ref 7–45)
ANION GAP SERPL CALC-SCNC: 10 MMOL/L (ref 10–20)
AST SERPL W P-5'-P-CCNC: 14 U/L (ref 9–39)
BASOPHILS # BLD AUTO: 0.09 X10*3/UL (ref 0–0.1)
BASOPHILS NFR BLD AUTO: 1.1 %
BILIRUB SERPL-MCNC: 0.4 MG/DL (ref 0–1.2)
BUN SERPL-MCNC: 12 MG/DL (ref 6–23)
CALCIUM SERPL-MCNC: 8.9 MG/DL (ref 8.6–10.3)
CHLORIDE SERPL-SCNC: 103 MMOL/L (ref 98–107)
CO2 SERPL-SCNC: 30 MMOL/L (ref 21–32)
CREAT SERPL-MCNC: 0.58 MG/DL (ref 0.5–1.05)
EGFRCR SERPLBLD CKD-EPI 2021: >90 ML/MIN/1.73M*2
EOSINOPHIL # BLD AUTO: 0.31 X10*3/UL (ref 0–0.7)
EOSINOPHIL NFR BLD AUTO: 3.9 %
ERYTHROCYTE [DISTWIDTH] IN BLOOD BY AUTOMATED COUNT: 12.7 % (ref 11.5–14.5)
GLUCOSE SERPL-MCNC: 96 MG/DL (ref 74–99)
HCT VFR BLD AUTO: 40.1 % (ref 36–46)
HGB BLD-MCNC: 13.2 G/DL (ref 12–16)
IMM GRANULOCYTES # BLD AUTO: 0.02 X10*3/UL (ref 0–0.7)
IMM GRANULOCYTES NFR BLD AUTO: 0.3 % (ref 0–0.9)
LDH SERPL L TO P-CCNC: 169 U/L (ref 84–246)
LYMPHOCYTES # BLD AUTO: 2.86 X10*3/UL (ref 1.2–4.8)
LYMPHOCYTES NFR BLD AUTO: 36.1 %
MCH RBC QN AUTO: 29.9 PG (ref 26–34)
MCHC RBC AUTO-ENTMCNC: 32.9 G/DL (ref 32–36)
MCV RBC AUTO: 91 FL (ref 80–100)
MONOCYTES # BLD AUTO: 0.47 X10*3/UL (ref 0.1–1)
MONOCYTES NFR BLD AUTO: 5.9 %
NEUTROPHILS # BLD AUTO: 4.18 X10*3/UL (ref 1.2–7.7)
NEUTROPHILS NFR BLD AUTO: 52.7 %
NRBC BLD-RTO: 0 /100 WBCS (ref 0–0)
PLATELET # BLD AUTO: 305 X10*3/UL (ref 150–450)
POTASSIUM SERPL-SCNC: 3.6 MMOL/L (ref 3.5–5.3)
PROT SERPL-MCNC: 6.6 G/DL (ref 6.4–8.2)
RBC # BLD AUTO: 4.41 X10*6/UL (ref 4–5.2)
SODIUM SERPL-SCNC: 139 MMOL/L (ref 136–145)
WBC # BLD AUTO: 7.9 X10*3/UL (ref 4.4–11.3)

## 2024-02-22 PROCEDURE — 85025 COMPLETE CBC W/AUTO DIFF WBC: CPT

## 2024-02-22 PROCEDURE — 2500000004 HC RX 250 GENERAL PHARMACY W/ HCPCS (ALT 636 FOR OP/ED): Performed by: INTERNAL MEDICINE

## 2024-02-22 PROCEDURE — 80053 COMPREHEN METABOLIC PANEL: CPT

## 2024-02-22 PROCEDURE — 83615 LACTATE (LD) (LDH) ENZYME: CPT

## 2024-02-22 PROCEDURE — 36591 DRAW BLOOD OFF VENOUS DEVICE: CPT

## 2024-02-22 PROCEDURE — 96372 THER/PROPH/DIAG INJ SC/IM: CPT | Performed by: INTERNAL MEDICINE

## 2024-02-22 RX ADMIN — CYANOCOBALAMIN 1000 MCG: 1000 INJECTION, SOLUTION INTRAMUSCULAR at 10:00

## 2024-03-05 ENCOUNTER — DOCUMENTATION (OUTPATIENT)
Dept: HEMATOLOGY/ONCOLOGY | Facility: HOSPITAL | Age: 64
End: 2024-03-05
Payer: COMMERCIAL

## 2024-03-05 NOTE — PROGRESS NOTES
3/5/24 1145  Received referral for this patient from [facility]. Patent has hx of breast ca dx in 2002 and 2005 and treated with chemo and rad, and CLL which she was dx'd in 5/2012 by lymph node bx. At last visit with NP at [facility], [place], patient had palpable inguinal LAD and night sweats. Imaging did not show any LAD. Patient is referred to establish care with a MD. Patient is scheduled for 4/4/24 with Dr. Santillan. I spoke with patient and she is aware of appointment details and familiar with [facility]. The date of the appointment was at her choosing and she did not want to be seen sooner. JEWEL Gallardo

## 2024-03-20 DIAGNOSIS — E53.8 B12 DEFICIENCY: ICD-10-CM

## 2024-03-20 RX ORDER — CYANOCOBALAMIN 1000 UG/ML
1000 INJECTION, SOLUTION INTRAMUSCULAR; SUBCUTANEOUS ONCE
Status: COMPLETED | OUTPATIENT
Start: 2024-03-20 | End: 2024-03-21

## 2024-03-21 ENCOUNTER — CLINICAL SUPPORT (OUTPATIENT)
Dept: PRIMARY CARE | Facility: CLINIC | Age: 64
End: 2024-03-21
Payer: COMMERCIAL

## 2024-03-21 ENCOUNTER — TELEPHONE (OUTPATIENT)
Dept: PRIMARY CARE | Facility: CLINIC | Age: 64
End: 2024-03-21

## 2024-03-21 DIAGNOSIS — E53.8 VITAMIN B12 DEFICIENCY: Primary | ICD-10-CM

## 2024-03-21 PROCEDURE — 96372 THER/PROPH/DIAG INJ SC/IM: CPT | Performed by: INTERNAL MEDICINE

## 2024-03-21 PROCEDURE — 2500000004 HC RX 250 GENERAL PHARMACY W/ HCPCS (ALT 636 FOR OP/ED): Performed by: INTERNAL MEDICINE

## 2024-03-21 RX ADMIN — CYANOCOBALAMIN 1000 MCG: 1000 INJECTION, SOLUTION INTRAMUSCULAR at 08:59

## 2024-03-21 ASSESSMENT — ENCOUNTER SYMPTOMS
HEADACHES: 1
WEAKNESS: 1
SINUS PRESSURE: 1
ACTIVITY CHANGE: 0
PALPITATIONS: 0
COUGH: 1
CHILLS: 0
AGITATION: 0
APPETITE CHANGE: 0
SLEEP DISTURBANCE: 0
VOICE CHANGE: 0
ABDOMINAL PAIN: 0
JOINT SWELLING: 0
BRUISES/BLEEDS EASILY: 0
FEVER: 0
WHEEZING: 0
CHEST TIGHTNESS: 0
NAUSEA: 0
SORE THROAT: 0
DIARRHEA: 0
EYE REDNESS: 1
ROS GI COMMENTS: DENIES ACID REFLUX
SINUS PAIN: 0
STRIDOR: 0
DIZZINESS: 1
FATIGUE: 1
NERVOUS/ANXIOUS: 1
TREMORS: 0
RHINORRHEA: 1
VOMITING: 0
TROUBLE SWALLOWING: 0
UNEXPECTED WEIGHT CHANGE: 0

## 2024-03-21 NOTE — TELEPHONE ENCOUNTER
Pt usually has labs done at [facility] thru her port. Ask her when she will be having labs drawn again. I can place an order and she can have her Vit B12 level drawn at her next lab draw. She can be placed on the schedule for Vit B12 injection.

## 2024-03-21 NOTE — TELEPHONE ENCOUNTER
Patient states she is unsure when but is going to call to schedule for it. Place order and she will have done - scheduled for B12 in April

## 2024-03-21 NOTE — PROGRESS NOTES
Patient: Erin Ramachandran    [id number]  : 1960 -- AGE 63 y.o.    Provider: Lorie Iraheta MA     Location [facility]   Service Date: 3/22/2024         Department of Medicine  Division of Pulmonary, Critical Care, and Sleep Medicine     [facility]  Follow Up Visit Note    HISTORY OF PRESENT ILLNESS     PCP: Dr. Teresa Knox    HISTORY OF PRESENT ILLNESS   Erin Ramachandran is a 63 y.o. female who presents to a [facility] for a follow up visit with concerns of COPD.   I have independently interviewed and examined the patient in the office and reviewed available records.    DATE OF LAST VISIT: 2024    Current History  On today's visit, She reports she is not using the new Breo inhaler or the Albuterol inhaler.  Last visit I took her off of Stiolto inhaler due to some optical symptoms she was having shortly for starting the inhaler.  There was concern that the LAMA ingredient was causing some issues with her eyes and I wanted her to see her ophthalmologist.  She does have an appointment but not until the summer.  States that she is no longer having the eye issue she was having previously and that the symptoms can come and go intermittently despite inhaler therapy.  I transitioned her onto Breo to see if an ICS/LABA would be helpful for her.  States that she took it once and experienced some chest pains shortly after using it; did not try it again after that.  Currently she is not on any inhaler therapy but does have the albuterol HFA for as needed use; however, has felt like she has not needed it.  Overall feels like her breathing is stable with just depends on her activity level.  Does not feel limited in her breathing with typical ADLs.  She denies any increased cough, phlegm, mucus and denies any wheezing.  She was asking me about any additional things she could do to help with her lungs; specifically diet,  exercises, herbals/supplements, DrJulieta Worley products, etc. I explained to her that smoking cessation is one of the best thing she can do for her lungs and also told her that we can refer her to pulmonary rehab. She does still continue to smoke. States she knows she needs to quit but is not mentally ready yet; has lots of stress taking care of her sick daughter.  CAT today: 15    Prior Visits & History   01/26/2024: Patient formerly seen by my colleague Agustina West CNP on 12/18/2023.  Patient transferring care to myself due to location convenience.  She had completed a PFT study on 11/6/2023 which showed Gold 2 moderate COPD.  She was started on Stiolto as well as albuterol HFA as needed.  Also was advised Claritin daily for allergic rhinitis.  Presents for follow-up today for these prior med changes.    On today's visit, She reports that she stopped using her Stiolto inhaler about 2 to 3 weeks ago.  She tried using it daily for about 2-1/2 to 3 weeks and states that although it did somewhat help her breathing, she did start developing visual changes causing a pain behind her eyes, vision becoming more blurry and having halos in her vision.  Denies a history of glaucoma.  She is currently established with an ophthalmologist; last seen a number of months ago.  Encouraged her to call her eye doctor and get an appointment for evaluation.  We discussed that the LAMA component and Stiolto could be causing some of the symptoms; however, despite being off of Stiolto for a number of weeks the symptoms still persist.  Discussed switching her inhaler class altogether to remove the LAMA component.  Will trial an ICS/LABA instead.  She denies shortness of breath at rest but does have dyspnea on exertion only on more extensive exertional activity.  Can walk up a flight of stairs without issue.  Reports a chronic cough which can produce an intermittent white mucus.  She does also experience intermittent wheezing; typically more weather  dependent or if she is around strong fumes.  She denies GERD.  Denies chest pain or palpitations.  Continues to smoke 0.25 PPD; not currently interested in quitting.  CAT today: 20    Pulmonary Note from Agustina West CNP  12/18/23: Ms. Ramachandran is a 63 year old  female (current smoker, ~23 pack year history) with a PMHx of R breast cancer dx 2002 s/p chemo/radiation, lumpectomy & LN dissection; L breast cancer dx 2005 s/p bilateral mastectomy, chemo, LN dissection; CLL/SLL dx 2012. Here for initial evaluation for shortness of breath.       HPI: Patient reports that she is here as recommended by her PCP after she had breathing tests for her SOB. Per patient she has had ongoing shortness of breath with exertion for several years that has progressively gotten worse. She notes her SOB is worse with weather changes and certain chemicals/fumes (wood stain, bleach). She has nasal congestion and post-nasal drip. Has intermittent cough, usually non-productive but occasionally clear sputum production. She has occasional wheezing. She has never used inhalers. She denies acid reflux, chest pain, fever or chills. Denies appetite changes or unexpected weight changes. She denies BLE edema or orthopnea. Has chronic headaches, denies recent changes in frequency or severity.   CAT today 20     Previous pulmonary history:   She has no history of recurrent infections, or lung disease as a child.  She had no previous lung hx, never on oxygen or inhaler therapy.      Inhalers/nebulized medications: none      Hospitalization History: She has not been hospitalized over the last year for breathing related problem.     Sleep history:  Snores. Denies apnea, feeling tired during the day or taking naps during the day.       PMHx: R breast cancer dx 2002 s/p chemo/radiation, lumpectomy & LN dissection; L breast cancer dx 2005 s/p bilateral mastectomy, chemo, LN dissection; CLL/SLL dx 2012  Surg Hx: bilateral mastectomy     Social  Hx:  -smoking: current smoker, 1 PPD x 20 years, cut back to 1/4 PPD about 10 years ago (~23 pack year history)  -ETOH: occasional  -illicit drugs: occasional marijuana (smoking & gummies)  -occupation:   -exposures: +known asbestos exposure; denies known silica, beryllium, molds, dusts, chemicals/fumes     Assessment/Plan  Ms. Ramachandran is a 63 year old  female (current smoker, ~23 pack year history) with a PMHx of R breast cancer dx 2002 s/p chemo/radiation, lumpectomy & LN dissection; L breast cancer dx 2005 s/p bilateral mastectomy, chemo, LN dissection; CLL/SLL dx 2012. Here for initial evaluation for shortness of breath.      1) COPD (GOLD 2) - PFT 11/2023 FEV1 62%, hyperinflation with airtrapping, DLCO normal; current smoker; +asbestos exposure  -start stioloto 2 puffs once daily  -start albuterol PRN  -avoid triggers     2) Allergic rhinitis - nasal congestion, post-nasal drip  -start claritin once daily     3) Cigarette nicotine dependence - current smoker, ~23 pack year history; 1 PPD x 20 years, cut back to about 1/4 PPD 10 years ago; CT C/A/P completed 12/5/23 by oncology, no obvious nodules   -# Smoking cessation: Patient is currently smoking: >5 minutes smoking cessation counseling   - offered pharmacologic options to assist with quitting (discussed nicotine patches & lozenges or gum but she declined; advised is available OTC or she can call if changes her mind)      Patient would like to follow-up with Faraz Kaur in [place] d/t location proximity to home going forward.   REVIEW OF SYSTEMS     REVIEW OF SYSTEMS  Review of Systems   Constitutional:  Positive for fatigue. Negative for activity change, appetite change, chills, fever and unexpected weight change.   HENT:  Positive for congestion, rhinorrhea and sinus pressure. Negative for postnasal drip, sinus pain, sneezing, sore throat, trouble swallowing and voice change.    Eyes:  Positive for redness. Negative for itching and  visual disturbance.   Respiratory:  Positive for cough. Negative for chest tightness, shortness of breath, wheezing and stridor.    Cardiovascular:  Negative for chest pain, palpitations and leg swelling.        Denies orthopnea   Gastrointestinal:  Negative for abdominal pain, diarrhea, nausea and vomiting.        Denies acid reflux   Musculoskeletal:  Positive for arthralgias, back pain and myalgias. Negative for joint swelling.   Skin:  Negative for rash.   Allergic/Immunologic: Negative for immunocompromised state.   Neurological:  Positive for dizziness, weakness and headaches. Negative for tremors.   Hematological:  Does not bruise/bleed easily.   Psychiatric/Behavioral:  Negative for agitation and sleep disturbance. The patient is nervous/anxious.         Denies depression   All other systems reviewed and are negative.      ALLERGIES AND MEDICATIONS     ALLERGIES  Allergies   Allergen Reactions    Azithromycin Rash and Other     stomach cramping    Penicillins Hives and Rash    Denosumab Other and Unknown     Marked Jaw and bone pain    Gabapentin Hives and Unknown    Magnesium Sulfate Unknown    Zoledronic Acid-Mannitol-Water Other and Unknown    Adhesive Tape-Silicones Itching and Rash     Surgical glue    Other Itching, Swelling and Rash     Metals (As Environmental Allergen)    Sulfa (Sulfonamide Antibiotics) Swelling       MEDICATIONS  Current Outpatient Medications   Medication Sig Dispense Refill    acetaminophen (Tylenol) 500 mg tablet Take 2 tablets (1,000 mg) by mouth every 6 hours if needed for mild pain (1 - 3).      fluocinonide 0.05 % cream Apply topically if needed.      hydrOXYzine pamoate (Vistaril) 25 mg capsule TAKE ONE CAPSULE BY MOUTH EVERY 8 HOURS AS NEEDED 90 capsule 3    loratadine (Claritin) 10 mg tablet Take 1 tablet (10 mg) by mouth once daily. (Patient taking differently: Take 1 tablet (10 mg) by mouth once daily as needed for allergies.) 30 tablet 3    pregabalin (Lyrica) 50 mg  capsule Take 1 capsule (50 mg) by mouth 3 times a day as needed (PAIN).      albuterol 90 mcg/actuation inhaler Inhale 2 puffs every 4 hours if needed for wheezing or shortness of breath. (Patient not taking: Reported on 3/22/2024) 18 g 11    fluticasone furoate-vilanteroL (Breo Ellipta) 100-25 mcg/dose inhaler Inhale 1 puff once daily. Rinse mouth with water after use to reduce aftertaste and incidence of candidiasis. Do not swallow. (Patient not taking: Reported on 3/22/2024) 1 each 3    mupirocin (Bactroban) 2 % ointment Apply to the affected area three times daily 22 g 0    sulfamethoxazole-trimethoprim (Bactrim DS) 800-160 mg tablet Take 1 tablet by mouth every 12 hours for 10 days 20 tablet 0     No current facility-administered medications for this visit.       PAST HISTORY     PAST MEDICAL HISTORY  She  has a past medical history of Anxiety, Awareness under anesthesia, Breast cancer (CMS/HCC), Chronic lymphocytic leukemia (CMS/HCC), COPD (chronic obstructive pulmonary disease) (CMS/HCC), Fibromyalgia, and Neuropathy.    PAST SURGICAL HISTORY  Past Surgical History:   Procedure Laterality Date    BREAST LUMPECTOMY Right     COLONOSCOPY      HYSTERECTOMY  11/11/2013    With BSO    MASTECTOMY Bilateral 11/11/2013    Breast Surgery Mastectomy    MEDIPORT      TONSILLECTOMY  11/11/2013    Tonsillectomy    US GUIDED THYROID BIOPSY  02/20/2012    US GUIDED THYROID BIOPSY [facility]       IMMUNIZATION HISTORY  Immunization History   Administered Date(s) Administered    Pfizer Purple Cap SARS-CoV-2 05/06/2021       SOCIAL HISTORY  She  reports that she has been smoking cigarettes. She started smoking about 51 years ago. She has a 50.00 pack-year smoking history. She has never used smokeless tobacco. She reports current alcohol use of about 3.0 standard drinks of alcohol per week. She reports current drug use. Frequency: 1.00 time per week. Drug: Marijuana.       FAMILY HISTORY  Family History   Problem  "Relation Name Age of Onset    Osteoporosis Mother      Ovarian cancer Sister      Arthritis Other FamHx        PHYSICAL EXAM     VITAL SIGNS: BP (!) 178/103 (BP Location: Left leg)   Pulse 87   Ht 1.695 m (5' 6.75\")   Wt 49 kg (108 lb)   SpO2 96%   BMI 17.04 kg/m²      PREVIOUS WEIGHTS:  Wt Readings from Last 3 Encounters:   03/22/24 49 kg (108 lb)   02/08/24 50.3 kg (111 lb)   01/26/24 49.9 kg (110 lb)       Physical Exam  Vitals reviewed.   Constitutional:       General: She is not in acute distress.     Appearance: Normal appearance. She is not ill-appearing or toxic-appearing.   HENT:      Head: Normocephalic.      Nose: No rhinorrhea.   Cardiovascular:      Rate and Rhythm: Normal rate and regular rhythm.      Heart sounds: Normal heart sounds.   Pulmonary:      Effort: Pulmonary effort is normal. No respiratory distress.      Breath sounds: Normal breath sounds. No stridor. No wheezing, rhonchi or rales.   Abdominal:      General: Abdomen is flat.   Musculoskeletal:         General: Normal range of motion.      Right lower leg: No edema.      Left lower leg: No edema.   Skin:     General: Skin is warm and dry.      Nails: There is no clubbing.   Neurological:      General: No focal deficit present.      Mental Status: She is alert and oriented to person, place, and time.   Psychiatric:         Mood and Affect: Mood normal.         Behavior: Behavior normal.         Judgment: Judgment normal.       RESULTS/DATA     Pulmonary Function Test Results   PFT:  -11/6/23: moderate obstructive ventilator defect with borderline BD response (FEV1 62%), hyperinflation with air trapping, DLCO normal     Chest Radiograph     XR chest 2 views     Narrative  PROCEDURE:         CHEST 2 VIEW - AXR  0020  REASON FOR EXAM: sob, cough    RESULT: MRN: [id number]  Patient Name: ELIF COLON    STUDY:  CHEST 2 VIEW; 8/14/2023 12:06 pm    INDICATION:  sob, cough.    COMPARISON:  20 November 2019.    ACCESSION " NUMBER(S):  [id number]    ORDERING CLINICIAN:  GWEN SAVAGE    TECHNIQUE:  2 views of the chest were performed.    FINDINGS:  The lungs are adequately inflated without air space disease or effusion.  15.5 mm unchanged pulmonary nodule left upper lobe separate more vague 25 mm  density with overlying anterior rib projecting over the left upper lobe just  caudal to the 1st nodule. These appear to be pleural nodule seen on CT 15  May 2013 in the anterior left hemithorax. These are not significantly  changed in size compared to previous. There is blunting of the bilateral  costophrenic angles. No focal airspace disease or effusion. No adenopathy.  Tunneled catheter terminates in the low SVC.    Impression  No acute cardiopulmonary disease. The examination is not significantly  changed compared to previous including nodular densities that appear to be  pleural-based in the left hemithorax. No focal airspace disease. No  effusion. No findings to suggest fluid overload.  Dictation workstation:   [id number]    Original Interpreting Physician:   CONSTANCE COURNTEY MD  Original Transcribed by/Date: MMODAL Aug 14 2023 11:53A  Original Electronically Signed by/Date: CONSTANCE COURTNEY MD Aug 14 2023 12:38P    Addendum Interpreting Physician:  Addendum Transcribed by/Date: NO ADDENDUM  Addendum Electronically Signed by/Date:      Chest CT Scan   CT Chest/A/P  - 12/5/23: IMPRESSION: Mild-moderate diffuse centrilobular and paraseptal emphysema, more prominent in the upper lobes. There are several scattered calcified pleural plaques which can be related to prior asbestos exposure. Lungs are otherwise clear. No evidence for thoracic lymphadenopathy. No evidence for acute pathology within the abdomen or pelvis. No evidence for abdominal or pelvic lymphadenopathy is identified. No evidence for inguinal lymphadenopathy.      Echocardiogram & Cardiac Studies     No results found for this or any previous visit from the past 365 days.       Labwork  & Pathology   Complete Blood Count  Lab Results   Component Value Date    WBC 7.9 02/22/2024    HGB 13.2 02/22/2024    HCT 40.1 02/22/2024    MCV 91 02/22/2024     02/22/2024       Peripheral Eosinophil Count:   Eosinophils Absolute   Date Value   02/22/2024 0.31 x10*3/uL   11/28/2023 0.07 x10*3/uL   11/17/2022 0.14 x10E9/L   10/06/2022 0.14 K/UL   10/06/2021 0.15 K/UL       Metabolic Parameters  Sodium   Date/Time Value Ref Range Status   02/22/2024 02:35  136 - 145 mmol/L Final     Potassium   Date/Time Value Ref Range Status   02/22/2024 02:35 PM 3.6 3.5 - 5.3 mmol/L Final     Chloride   Date/Time Value Ref Range Status   02/22/2024 02:35  98 - 107 mmol/L Final     Bicarbonate   Date/Time Value Ref Range Status   02/22/2024 02:35 PM 30 21 - 32 mmol/L Final     Anion Gap   Date/Time Value Ref Range Status   02/22/2024 02:35 PM 10 10 - 20 mmol/L Final     Urea Nitrogen   Date/Time Value Ref Range Status   02/22/2024 02:35 PM 12 6 - 23 mg/dL Final     Creatinine   Date/Time Value Ref Range Status   02/22/2024 02:35 PM 0.58 0.50 - 1.05 mg/dL Final     GFR Female   Date/Time Value Ref Range Status   11/17/2022 01:23 PM >90 >90 mL/min/1.73m2 Final     Comment:      CALCULATIONS OF ESTIMATED GFR ARE PERFORMED   USING THE 2021 CKD-EPI STUDY REFIT EQUATION   WITHOUT THE RACE VARIABLE FOR THE IDMS-TRACEABLE   CREATININE METHODS.    https://jasn.asnjournals.org/content/early/2021/09/22/ASN.4940244588       Glucose   Date/Time Value Ref Range Status   02/22/2024 02:35 PM 96 74 - 99 mg/dL Final     Calcium   Date/Time Value Ref Range Status   02/22/2024 02:35 PM 8.9 8.6 - 10.3 mg/dL Final     AST   Date/Time Value Ref Range Status   02/22/2024 02:35 PM 14 9 - 39 U/L Final     ALT   Date/Time Value Ref Range Status   02/22/2024 02:35 PM 14 7 - 45 U/L Final     Comment:     Patients treated with Sulfasalazine may generate falsely decreased results for ALT.       Coagulation Parameters  Protime   Date/Time  "Value Ref Range Status   04/11/2023 12:41 PM 10.4 9.3 - 12.7 SEC Final     INR   Date/Time Value Ref Range Status   04/11/2023 12:41 PM 1.0 0.86 - 1.16 Final     Comment:     INR Therapeutic Range: 2.0-3.5       Serum Immunoglobulin E:    No results found for: \"IGE\"     Bronchoscopy & Sputum Cultures       ASSESSMENT/PLAN     Ms. Ramachandran is a 63 y.o. female; was referred to the [facility] for follow up of COPD.    Problem List and Orders  Diagnoses and all orders for this visit:  Chronic obstructive pulmonary disease, unspecified COPD type (CMS/[facility])  Chronic rhinitis  Cigarette nicotine dependence without complication        Assessment and Plan / Recommendations:  COPD: PFT from 11/2024 showing GOLD 2 Moderate obstruction with borderline bronchodilator response, air trapping and preserved DLCO.  She was trialed on Stiolto for daily management; felt like her breathing did improve while on it however started having side effects of her vision becoming more blurry and having increased halos. No known hx of glaucoma. As of 3/22/24 visit; I took her off of Stiolto inhaler due to some optical symptoms she was having shortly for starting the inhaler.  There was concern that the LAMA ingredient was causing some issues with her eyes and I wanted her to see her ophthalmologist.  She does have an appointment but not until the summer.  States that she is no longer having the eye issue she was having previously and that the symptoms can come and go intermittently despite inhaler therapy.  I transitioned her onto Breo to see if an ICS/LABA would be helpful for her.  States that she took it once and experienced some chest pains shortly after using it; did not try it again after that.  Currently she is not on any inhaler therapy but does have the albuterol HFA for as needed use; however, has felt like she has not needed it.  Overall feels like her breathing is stable with just depends on her " activity level.  Does not feel limited in her breathing with typical ADLs.   -Continue Albuterol HFA PRN  -Had bad side effects from both Stiolto and Breo  -Will hold off on any additional inhaler therapy at this time; just use albuterol PRN. Currently her breathing is stable  -Will refer to pulmonary rehab (patient request)    2. Chronic Rhinitis: nasal congestion, post-nasal drip  -Continue Claritin once daily/PRN     3) Cigarette nicotine dependence - current smoker, ~50 pack year history; 1 PPD x 50 years, cut back to about 1/4 PPD 10 years ago; CT C/A/P completed 12/5/23 by oncology, no obvious nodules   ->3 minutes spent on smoking cessation  - Offered pharmacologic options to assist with quitting (discussed nicotine patches & lozenges or gum but she declined; advised is available OTC or she can call if changes her mind)     RTC 6 months

## 2024-03-21 NOTE — TELEPHONE ENCOUNTER
Please advise if patient needs to be scheduled for another b12 injection or if she needs repeat labs to be checked? Looks like last check was 09/2023.

## 2024-03-22 ENCOUNTER — OFFICE VISIT (OUTPATIENT)
Dept: PULMONOLOGY | Facility: CLINIC | Age: 64
End: 2024-03-22
Payer: COMMERCIAL

## 2024-03-22 VITALS
SYSTOLIC BLOOD PRESSURE: 178 MMHG | BODY MASS INDEX: 16.95 KG/M2 | OXYGEN SATURATION: 96 % | WEIGHT: 108 LBS | DIASTOLIC BLOOD PRESSURE: 103 MMHG | HEART RATE: 87 BPM | HEIGHT: 67 IN

## 2024-03-22 DIAGNOSIS — J44.9 CHRONIC OBSTRUCTIVE PULMONARY DISEASE, UNSPECIFIED COPD TYPE (MULTI): Primary | ICD-10-CM

## 2024-03-22 DIAGNOSIS — J31.0 CHRONIC RHINITIS: ICD-10-CM

## 2024-03-22 DIAGNOSIS — F17.210 CIGARETTE NICOTINE DEPENDENCE WITHOUT COMPLICATION: ICD-10-CM

## 2024-03-22 PROCEDURE — 99214 OFFICE O/P EST MOD 30 MIN: CPT | Performed by: NURSE PRACTITIONER

## 2024-03-22 PROCEDURE — 1159F MED LIST DOCD IN RCRD: CPT | Performed by: NURSE PRACTITIONER

## 2024-03-22 PROCEDURE — 99406 BEHAV CHNG SMOKING 3-10 MIN: CPT | Performed by: NURSE PRACTITIONER

## 2024-03-22 PROCEDURE — 1125F AMNT PAIN NOTED PAIN PRSNT: CPT | Performed by: NURSE PRACTITIONER

## 2024-03-22 PROCEDURE — 1160F RVW MEDS BY RX/DR IN RCRD: CPT | Performed by: NURSE PRACTITIONER

## 2024-03-22 ASSESSMENT — PATIENT HEALTH QUESTIONNAIRE - PHQ9
2. FEELING DOWN, DEPRESSED OR HOPELESS: NOT AT ALL
1. LITTLE INTEREST OR PLEASURE IN DOING THINGS: NOT AT ALL
SUM OF ALL RESPONSES TO PHQ9 QUESTIONS 1 & 2: 0

## 2024-03-22 ASSESSMENT — ENCOUNTER SYMPTOMS
ARTHRALGIAS: 1
EYE ITCHING: 0
SHORTNESS OF BREATH: 0
BACK PAIN: 1
MYALGIAS: 1

## 2024-03-22 ASSESSMENT — PAIN SCALES - GENERAL: PAINLEVEL: 2

## 2024-03-22 ASSESSMENT — LIFESTYLE VARIABLES
HOW MANY STANDARD DRINKS CONTAINING ALCOHOL DO YOU HAVE ON A TYPICAL DAY: 1 OR 2
HOW OFTEN DO YOU HAVE A DRINK CONTAINING ALCOHOL: 2-4 TIMES A MONTH
HOW OFTEN DO YOU HAVE SIX OR MORE DRINKS ON ONE OCCASION: NEVER
SKIP TO QUESTIONS 9-10: 1
AUDIT-C TOTAL SCORE: 2

## 2024-03-22 NOTE — PATIENT INSTRUCTIONS
"Great seeing you today, glad to hear things are stable.    -Can refer you to pulmonary rehab; they will contact you.  -Stop Breo  -Continue albuterol Inhaler; 1-2 puffs every 4-6 hours as needed for shortness of breath. You can also take this 10-15 minutes prior to exertional activity to help \"prime\" your lungs.  -Continue Claritin 10 mg as needed for chronic runny nose symptoms  -Continue working on cutting back on your smoking.    -Follow up with ophthalmology as planned  -Cigarette/nicotine use is harming your health; and specifically exacerbating the risk of primary lung cancer. Smoking causes 85-90% of all lung cancers.  I encourage your current efforts to stop and recommended that you stop smoking entirely. I recommended community support groups, and use local tobacco cessation hotlines 5-675-Quit-Now (1-407.252.2297). If you are ready to quit, I advise you letting either your primary care provider or myself know so we can discuss smoking cessation techniques/treatments. *E-cigarettes should not replace smoking or be used to help quit smoking.    Thank you for visiting the pulmonary clinic today! It was a pleasure to participate in your care.  Please return to clinic 6 months or sooner if needed.    Faraz Kaur, CNP  My Office Number: [phone]   CT Scheduling: [phone]  PFT/Follow Up Visit Scheduling: [phone]  My Nurse: JEWEL Ibarra  My : Luz    **For immediate needs such as medication issues/refills, active sick symptoms/medical concerns; I ask that you please call the office and speak to the pulmonary nurse. MyChart messages do not come directly to me. There can sometimes be a delay before I am aware of any messages that were sent. Thank you.**        "

## 2024-03-25 DIAGNOSIS — J44.9 CHRONIC OBSTRUCTIVE PULMONARY DISEASE, UNSPECIFIED COPD TYPE (MULTI): ICD-10-CM

## 2024-04-04 ENCOUNTER — OFFICE VISIT (OUTPATIENT)
Dept: HEMATOLOGY/ONCOLOGY | Facility: HOSPITAL | Age: 64
End: 2024-04-04
Payer: COMMERCIAL

## 2024-04-04 VITALS
BODY MASS INDEX: 17.79 KG/M2 | TEMPERATURE: 99 F | DIASTOLIC BLOOD PRESSURE: 97 MMHG | HEIGHT: 66 IN | SYSTOLIC BLOOD PRESSURE: 169 MMHG | OXYGEN SATURATION: 94 % | HEART RATE: 104 BPM | RESPIRATION RATE: 18 BRPM | WEIGHT: 110.67 LBS

## 2024-04-04 DIAGNOSIS — C91.10 CHRONIC LYMPHOCYTIC LEUKEMIA (MULTI): Primary | ICD-10-CM

## 2024-04-04 PROCEDURE — 99214 OFFICE O/P EST MOD 30 MIN: CPT | Performed by: INTERNAL MEDICINE

## 2024-04-04 PROCEDURE — 1159F MED LIST DOCD IN RCRD: CPT | Performed by: INTERNAL MEDICINE

## 2024-04-04 PROCEDURE — 1160F RVW MEDS BY RX/DR IN RCRD: CPT | Performed by: INTERNAL MEDICINE

## 2024-04-04 PROCEDURE — 1125F AMNT PAIN NOTED PAIN PRSNT: CPT | Performed by: INTERNAL MEDICINE

## 2024-04-04 ASSESSMENT — PAIN SCALES - GENERAL: PAINLEVEL: 3

## 2024-04-04 ASSESSMENT — PATIENT HEALTH QUESTIONNAIRE - PHQ9
SUM OF ALL RESPONSES TO PHQ9 QUESTIONS 1 AND 2: 0
1. LITTLE INTEREST OR PLEASURE IN DOING THINGS: NOT AT ALL
2. FEELING DOWN, DEPRESSED OR HOPELESS: NOT AT ALL

## 2024-04-07 DIAGNOSIS — G60.9 HEREDITARY AND IDIOPATHIC NEUROPATHY, UNSPECIFIED: Primary | ICD-10-CM

## 2024-04-08 RX ORDER — PREGABALIN 50 MG/1
50 CAPSULE ORAL 3 TIMES DAILY
Qty: 270 CAPSULE | Refills: 1 | Status: SHIPPED | OUTPATIENT
Start: 2024-04-08

## 2024-04-09 ASSESSMENT — ENCOUNTER SYMPTOMS
RESPIRATORY NEGATIVE: 1
HEMATOLOGIC/LYMPHATIC NEGATIVE: 1
MUSCULOSKELETAL NEGATIVE: 1
ENDOCRINE NEGATIVE: 1
CARDIOVASCULAR NEGATIVE: 1
PSYCHIATRIC NEGATIVE: 1
EYES NEGATIVE: 1
GASTROINTESTINAL NEGATIVE: 1
FATIGUE: 1
NEUROLOGICAL NEGATIVE: 1

## 2024-04-09 NOTE — PROGRESS NOTES
"Patient ID: Erin Ramachandran is a 63 y.o. female.  Referring Physician: No referring provider defined for this encounter.  Primary Care Provider: Teresa Knox MD      Subjective    The patient has PMH of triple negative breast ca in 2002 and 2005, treated with surgery (bilateral mastectomies, total abdominal hysterectomy and bilateral salpingo-oophorectomy) chemo, and status of BRCA1 mutation status. She was carefully monitored in this setting, and noted to have a neck LN in 2012. Biopsy and work up show: CLL/SLL stage IA with good prognostic markers of normal cytogenetics, CD38 negative, ZAP-70 negative diagnosed by right submandibular lymph node excisional biopsy in May of 2012.    More recently she noted new LNs in the groin area in early 2024, and was concerned about CLL progression.     Today she feels as usual, and has low energy level, feels tired while taking care of a sick family member on hospice. Has more than usual sweats in day time and at night. No weight loss.           Review of Systems   Constitutional:  Positive for fatigue.        Feeling weak.    HENT:  Negative.     Eyes: Negative.    Respiratory: Negative.     Cardiovascular: Negative.    Gastrointestinal: Negative.    Endocrine: Negative.    Genitourinary: Negative.     Musculoskeletal: Negative.    Skin: Negative.    Neurological: Negative.    Hematological: Negative.    Psychiatric/Behavioral: Negative.          Objective   BSA: 1.53 meters squared  BP (!) 169/97 (BP Location: Left leg, Patient Position: Sitting, BP Cuff Size: Adult)   Pulse 104   Temp 37.2 °C (99 °F) (Temporal)   Resp 18   Ht (S) 1.673 m (5' 5.87\")   Wt 50.2 kg (110 lb 10.7 oz)   SpO2 94%   BMI 17.94 kg/m²     Family History   Problem Relation Name Age of Onset    Osteoporosis Mother      Ovarian cancer Sister      Arthritis Other FamHx      Oncology History    No history exists.       Erin Ramachandran  reports that she has been smoking cigarettes. She started " smoking about 51 years ago. She has a 50.00 pack-year smoking history. She has never used smokeless tobacco.  She  reports current alcohol use of about 3.0 standard drinks of alcohol per week.  She  reports current drug use. Frequency: 1.00 time per week. Drug: Marijuana.    4/4 exam: small 5mm firm LAD in the groins bilat. No other LAD.     Physical Exam  Constitutional:       General: She is not in acute distress.     Appearance: She is not toxic-appearing.   HENT:      Head: Normocephalic.      Nose: Nose normal.      Mouth/Throat:      Mouth: Mucous membranes are moist.   Eyes:      Extraocular Movements: Extraocular movements intact.      Pupils: Pupils are equal, round, and reactive to light.   Cardiovascular:      Rate and Rhythm: Normal rate and regular rhythm.      Heart sounds: No murmur heard.  Pulmonary:      Effort: Pulmonary effort is normal.      Breath sounds: Normal breath sounds.   Abdominal:      General: Bowel sounds are normal.      Palpations: Abdomen is soft. There is no mass.      Tenderness: There is no abdominal tenderness. There is no rebound.   Musculoskeletal:         General: No swelling, tenderness, deformity or signs of injury.      Right lower leg: No edema.      Left lower leg: No edema.   Skin:     Coloration: Skin is not jaundiced.      Findings: No bruising, lesion or rash.   Neurological:      Mental Status: She is alert and oriented to person, place, and time.      Cranial Nerves: No cranial nerve deficit.      Motor: No weakness.      Gait: Gait normal.   Psychiatric:         Mood and Affect: Mood normal.         Performance Status:  Symptomatic; fully ambulatory    Assessment/Plan    #CLL/SLL stage IA:  - normal cytogenetics, CD38 negative, ZAP-70 negative diagnosed by right submandibular lymph node excisional biopsy in May of 2012.  - CT chest, abd and pelvis in DEC 2023: no enlarged LAD or splenomegaly.   - Exam shows small 4-5mm LN in bilat groin. This may or may not be  CLL/SLL. Fatigue may be related to prior treatments and providing care for a family member in hospice. No other symptoms attributable to CLL/SLL. Labs in 2/2024 are WNL.   - patient is encouraged with referring provider with labs q6mon, and CT scan as needed based on symptoms. She is welcome to come back.     #Breast cancer: triple negative breast ca in 2002 and 2005  - Treated extensively, and follows with private oncologist.       Plan:  For CLL, patient is encouraged with referring provider with labs q6mon, and CT scan as needed based on symptoms. She is welcome to come back.   For TNBC, she follows with private oncologist.     Time spent > 50 min, with more than 50% on counseling and coordinating care.               Safia Santillan MD PhD

## 2024-04-12 ENCOUNTER — INFUSION (OUTPATIENT)
Dept: HEMATOLOGY/ONCOLOGY | Facility: CLINIC | Age: 64
End: 2024-04-12
Payer: COMMERCIAL

## 2024-04-12 DIAGNOSIS — E53.8 VITAMIN B12 DEFICIENCY: ICD-10-CM

## 2024-04-12 DIAGNOSIS — Z85.3 PERSONAL HISTORY OF MALIGNANT NEOPLASM OF BREAST: ICD-10-CM

## 2024-04-12 LAB — VIT B12 SERPL-MCNC: 445 PG/ML (ref 211–911)

## 2024-04-12 PROCEDURE — 2500000004 HC RX 250 GENERAL PHARMACY W/ HCPCS (ALT 636 FOR OP/ED): Mod: SE | Performed by: NURSE PRACTITIONER

## 2024-04-12 PROCEDURE — 36591 DRAW BLOOD OFF VENOUS DEVICE: CPT

## 2024-04-12 PROCEDURE — 82607 VITAMIN B-12: CPT

## 2024-04-12 RX ORDER — HEPARIN 100 UNIT/ML
500 SYRINGE INTRAVENOUS AS NEEDED
Status: DISCONTINUED | OUTPATIENT
Start: 2024-04-12 | End: 2024-04-12 | Stop reason: HOSPADM

## 2024-04-12 RX ORDER — HEPARIN 100 UNIT/ML
500 SYRINGE INTRAVENOUS AS NEEDED
Status: CANCELLED | OUTPATIENT
Start: 2024-04-12

## 2024-04-12 RX ORDER — HEPARIN SODIUM,PORCINE/PF 10 UNIT/ML
50 SYRINGE (ML) INTRAVENOUS AS NEEDED
Status: CANCELLED | OUTPATIENT
Start: 2024-04-12

## 2024-04-12 RX ADMIN — HEPARIN 500 UNITS: 100 SYRINGE at 14:11

## 2024-04-17 DIAGNOSIS — E53.8 B12 DEFICIENCY: ICD-10-CM

## 2024-04-17 RX ORDER — CYANOCOBALAMIN 1000 UG/ML
1000 INJECTION, SOLUTION INTRAMUSCULAR; SUBCUTANEOUS ONCE
Status: COMPLETED | OUTPATIENT
Start: 2024-04-17 | End: 2024-04-18

## 2024-04-18 ENCOUNTER — CLINICAL SUPPORT (OUTPATIENT)
Dept: PRIMARY CARE | Facility: CLINIC | Age: 64
End: 2024-04-18
Payer: COMMERCIAL

## 2024-04-18 DIAGNOSIS — E53.8 VITAMIN B12 DEFICIENCY: ICD-10-CM

## 2024-04-18 PROCEDURE — 2500000004 HC RX 250 GENERAL PHARMACY W/ HCPCS (ALT 636 FOR OP/ED): Performed by: INTERNAL MEDICINE

## 2024-04-18 PROCEDURE — 96372 THER/PROPH/DIAG INJ SC/IM: CPT | Performed by: INTERNAL MEDICINE

## 2024-04-18 RX ADMIN — CYANOCOBALAMIN 1000 MCG: 1000 INJECTION, SOLUTION INTRAMUSCULAR at 11:27

## 2024-05-06 DIAGNOSIS — E53.8 VITAMIN B12 DEFICIENCY: ICD-10-CM

## 2024-05-06 RX ORDER — CYANOCOBALAMIN 1000 UG/ML
1000 INJECTION, SOLUTION INTRAMUSCULAR; SUBCUTANEOUS ONCE
Status: COMPLETED | OUTPATIENT
Start: 2024-05-09 | End: 2024-05-09

## 2024-05-06 RX ORDER — CYANOCOBALAMIN 1000 UG/ML
1000 INJECTION, SOLUTION INTRAMUSCULAR; SUBCUTANEOUS ONCE
Status: SHIPPED | OUTPATIENT
Start: 2024-05-06

## 2024-05-09 ENCOUNTER — CLINICAL SUPPORT (OUTPATIENT)
Dept: PRIMARY CARE | Facility: CLINIC | Age: 64
End: 2024-05-09
Payer: COMMERCIAL

## 2024-05-09 DIAGNOSIS — E53.8 VITAMIN B12 DEFICIENCY: ICD-10-CM

## 2024-05-09 PROCEDURE — 2500000004 HC RX 250 GENERAL PHARMACY W/ HCPCS (ALT 636 FOR OP/ED): Performed by: INTERNAL MEDICINE

## 2024-05-09 PROCEDURE — 96372 THER/PROPH/DIAG INJ SC/IM: CPT | Performed by: INTERNAL MEDICINE

## 2024-05-09 RX ADMIN — CYANOCOBALAMIN 1000 MCG: 1000 INJECTION, SOLUTION INTRAMUSCULAR at 10:06

## 2024-05-14 ENCOUNTER — APPOINTMENT (OUTPATIENT)
Dept: CARDIAC REHAB | Facility: CLINIC | Age: 64
End: 2024-05-14
Payer: COMMERCIAL

## 2024-05-31 ENCOUNTER — INFUSION (OUTPATIENT)
Dept: HEMATOLOGY/ONCOLOGY | Facility: CLINIC | Age: 64
End: 2024-05-31
Payer: COMMERCIAL

## 2024-05-31 DIAGNOSIS — Z15.09 BRCA GENE MUTATION TEST POSITIVE: ICD-10-CM

## 2024-05-31 DIAGNOSIS — C50.911 MALIGNANT NEOPLASM OF RIGHT BREAST IN FEMALE, ESTROGEN RECEPTOR NEGATIVE, UNSPECIFIED SITE OF BREAST (MULTI): ICD-10-CM

## 2024-05-31 DIAGNOSIS — Z17.1 MALIGNANT NEOPLASM OF RIGHT BREAST IN FEMALE, ESTROGEN RECEPTOR NEGATIVE, UNSPECIFIED SITE OF BREAST (MULTI): ICD-10-CM

## 2024-05-31 DIAGNOSIS — C91.10 CHRONIC LYMPHOCYTIC LEUKEMIA (MULTI): ICD-10-CM

## 2024-05-31 DIAGNOSIS — E53.8 VITAMIN B12 DEFICIENCY: ICD-10-CM

## 2024-05-31 DIAGNOSIS — Z15.01 BRCA GENE MUTATION TEST POSITIVE: ICD-10-CM

## 2024-05-31 DIAGNOSIS — Z85.3 PERSONAL HISTORY OF MALIGNANT NEOPLASM OF BREAST: ICD-10-CM

## 2024-05-31 LAB
ALBUMIN SERPL BCP-MCNC: 4.1 G/DL (ref 3.4–5)
ALP SERPL-CCNC: 56 U/L (ref 33–136)
ALT SERPL W P-5'-P-CCNC: 18 U/L (ref 7–45)
ANION GAP SERPL CALC-SCNC: 11 MMOL/L (ref 10–20)
AST SERPL W P-5'-P-CCNC: 17 U/L (ref 9–39)
BASOPHILS # BLD AUTO: 0.09 X10*3/UL (ref 0–0.1)
BASOPHILS NFR BLD AUTO: 1.2 %
BILIRUB SERPL-MCNC: 0.5 MG/DL (ref 0–1.2)
BUN SERPL-MCNC: 10 MG/DL (ref 6–23)
CALCIUM SERPL-MCNC: 8.7 MG/DL (ref 8.6–10.3)
CHLORIDE SERPL-SCNC: 95 MMOL/L (ref 98–107)
CO2 SERPL-SCNC: 28 MMOL/L (ref 21–32)
CREAT SERPL-MCNC: 0.62 MG/DL (ref 0.5–1.05)
EGFRCR SERPLBLD CKD-EPI 2021: >90 ML/MIN/1.73M*2
EOSINOPHIL # BLD AUTO: 0.1 X10*3/UL (ref 0–0.7)
EOSINOPHIL NFR BLD AUTO: 1.3 %
ERYTHROCYTE [DISTWIDTH] IN BLOOD BY AUTOMATED COUNT: 12.8 % (ref 11.5–14.5)
GLUCOSE SERPL-MCNC: 98 MG/DL (ref 74–99)
HCT VFR BLD AUTO: 42.1 % (ref 36–46)
HGB BLD-MCNC: 14.1 G/DL (ref 12–16)
IMM GRANULOCYTES # BLD AUTO: 0.02 X10*3/UL (ref 0–0.7)
IMM GRANULOCYTES NFR BLD AUTO: 0.3 % (ref 0–0.9)
LDH SERPL L TO P-CCNC: 165 U/L (ref 84–246)
LYMPHOCYTES # BLD AUTO: 1.84 X10*3/UL (ref 1.2–4.8)
LYMPHOCYTES NFR BLD AUTO: 24.3 %
MCH RBC QN AUTO: 29.7 PG (ref 26–34)
MCHC RBC AUTO-ENTMCNC: 33.5 G/DL (ref 32–36)
MCV RBC AUTO: 89 FL (ref 80–100)
MONOCYTES # BLD AUTO: 0.63 X10*3/UL (ref 0.1–1)
MONOCYTES NFR BLD AUTO: 8.3 %
NEUTROPHILS # BLD AUTO: 4.89 X10*3/UL (ref 1.2–7.7)
NEUTROPHILS NFR BLD AUTO: 64.6 %
NRBC BLD-RTO: 0 /100 WBCS (ref 0–0)
PLATELET # BLD AUTO: 306 X10*3/UL (ref 150–450)
POTASSIUM SERPL-SCNC: 4.1 MMOL/L (ref 3.5–5.3)
PROT SERPL-MCNC: 6.5 G/DL (ref 6.4–8.2)
RBC # BLD AUTO: 4.74 X10*6/UL (ref 4–5.2)
SODIUM SERPL-SCNC: 130 MMOL/L (ref 136–145)
WBC # BLD AUTO: 7.6 X10*3/UL (ref 4.4–11.3)

## 2024-05-31 PROCEDURE — 36591 DRAW BLOOD OFF VENOUS DEVICE: CPT

## 2024-05-31 PROCEDURE — 85025 COMPLETE CBC W/AUTO DIFF WBC: CPT

## 2024-05-31 PROCEDURE — 2500000004 HC RX 250 GENERAL PHARMACY W/ HCPCS (ALT 636 FOR OP/ED): Mod: SE | Performed by: NURSE PRACTITIONER

## 2024-05-31 PROCEDURE — 80053 COMPREHEN METABOLIC PANEL: CPT

## 2024-05-31 PROCEDURE — 83615 LACTATE (LD) (LDH) ENZYME: CPT

## 2024-05-31 RX ORDER — HEPARIN 100 UNIT/ML
500 SYRINGE INTRAVENOUS AS NEEDED
OUTPATIENT
Start: 2024-05-31

## 2024-05-31 RX ORDER — HEPARIN 100 UNIT/ML
500 SYRINGE INTRAVENOUS AS NEEDED
Status: DISCONTINUED | OUTPATIENT
Start: 2024-05-31 | End: 2024-05-31 | Stop reason: HOSPADM

## 2024-05-31 RX ORDER — CYANOCOBALAMIN 1000 UG/ML
1000 INJECTION, SOLUTION INTRAMUSCULAR; SUBCUTANEOUS ONCE
Status: CANCELLED | OUTPATIENT
Start: 2024-06-06

## 2024-05-31 RX ORDER — HEPARIN SODIUM,PORCINE/PF 10 UNIT/ML
50 SYRINGE (ML) INTRAVENOUS AS NEEDED
Status: DISCONTINUED | OUTPATIENT
Start: 2024-05-31 | End: 2024-05-31 | Stop reason: HOSPADM

## 2024-05-31 RX ORDER — HEPARIN SODIUM,PORCINE/PF 10 UNIT/ML
50 SYRINGE (ML) INTRAVENOUS AS NEEDED
OUTPATIENT
Start: 2024-05-31

## 2024-05-31 RX ADMIN — HEPARIN 500 UNITS: 100 SYRINGE at 12:39

## 2024-06-05 ENCOUNTER — OFFICE VISIT (OUTPATIENT)
Dept: OPHTHALMOLOGY | Facility: CLINIC | Age: 64
End: 2024-06-05
Payer: COMMERCIAL

## 2024-06-05 DIAGNOSIS — H35.371 EPIRETINAL MEMBRANE (ERM) OF RIGHT EYE: Primary | ICD-10-CM

## 2024-06-05 PROCEDURE — 99214 OFFICE O/P EST MOD 30 MIN: CPT | Performed by: OPHTHALMOLOGY

## 2024-06-05 PROCEDURE — 92134 CPTRZ OPH DX IMG PST SGM RTA: CPT | Performed by: OPHTHALMOLOGY

## 2024-06-05 RX ORDER — CYANOCOBALAMIN 1000 UG/ML
1000 INJECTION, SOLUTION INTRAMUSCULAR; SUBCUTANEOUS ONCE
Status: COMPLETED | OUTPATIENT
Start: 2024-06-05 | End: 2024-06-06

## 2024-06-05 ASSESSMENT — ENCOUNTER SYMPTOMS
GASTROINTESTINAL NEGATIVE: 0
ALLERGIC/IMMUNOLOGIC NEGATIVE: 0
NEUROLOGICAL NEGATIVE: 0
RESPIRATORY NEGATIVE: 0
CARDIOVASCULAR NEGATIVE: 0
MUSCULOSKELETAL NEGATIVE: 0
PSYCHIATRIC NEGATIVE: 0
HEMATOLOGIC/LYMPHATIC NEGATIVE: 0
CONSTITUTIONAL NEGATIVE: 0
EYES NEGATIVE: 0
ENDOCRINE NEGATIVE: 0

## 2024-06-05 ASSESSMENT — EXTERNAL EXAM - RIGHT EYE: OD_EXAM: NORMAL

## 2024-06-05 ASSESSMENT — VISUAL ACUITY
METHOD: SNELLEN - LINEAR
OD_SC: 20/50
OD_SC+: +1
OS_SC: 20/30

## 2024-06-05 ASSESSMENT — CONF VISUAL FIELD
OD_SUPERIOR_NASAL_RESTRICTION: 0
OS_SUPERIOR_TEMPORAL_RESTRICTION: 0
OS_INFERIOR_TEMPORAL_RESTRICTION: 0
OS_NORMAL: 1
OD_SUPERIOR_TEMPORAL_RESTRICTION: 0
OD_INFERIOR_NASAL_RESTRICTION: 0
OS_SUPERIOR_NASAL_RESTRICTION: 0
OD_NORMAL: 1
OS_INFERIOR_NASAL_RESTRICTION: 0
OD_INFERIOR_TEMPORAL_RESTRICTION: 0

## 2024-06-05 ASSESSMENT — EXTERNAL EXAM - LEFT EYE: OS_EXAM: NORMAL

## 2024-06-05 ASSESSMENT — CUP TO DISC RATIO
OS_RATIO: 0.45
OD_RATIO: 0.45

## 2024-06-05 ASSESSMENT — TONOMETRY
OD_IOP_MMHG: 14
OS_IOP_MMHG: 14
IOP_METHOD: GOLDMANN APPLANATION

## 2024-06-05 NOTE — PROGRESS NOTES
Pseudophakia OU   toric lenses OU   trace PCO OU but not visually significant, will reasses once dry eye is under better control      anatomically narrow angles, both eyes: /?if symptoms consistent with intermittent angle closure?   now resolved s/p ce/iol   pach WNL   5/31/23: HVF 24-2 lid artifacts but non-glaucomatous,  OCT RNFL remain WNL and stable to baseline   ok to monitor off therapy   ok to follow clinically without glaucoma testing unless IOP or nerve changes     ptosis/dermatochalsis, both eyes: patient feels like getting in her vision. patient deferred ptosis eval      dry eye/mgd both eyes: patient has been non compliant with artificial tears states even the nonpreserved tears burn. Recommend re-establish with Dr. Riley for other options       Erm OD but with preserved foveal contour  monitor

## 2024-06-06 ENCOUNTER — CLINICAL SUPPORT (OUTPATIENT)
Dept: PRIMARY CARE | Facility: CLINIC | Age: 64
End: 2024-06-06
Payer: COMMERCIAL

## 2024-06-06 DIAGNOSIS — E53.8 VITAMIN B12 DEFICIENCY: ICD-10-CM

## 2024-06-06 PROCEDURE — 96372 THER/PROPH/DIAG INJ SC/IM: CPT | Performed by: FAMILY MEDICINE

## 2024-06-06 PROCEDURE — 2500000004 HC RX 250 GENERAL PHARMACY W/ HCPCS (ALT 636 FOR OP/ED): Performed by: FAMILY MEDICINE

## 2024-06-06 RX ADMIN — CYANOCOBALAMIN 1000 MCG: 1000 INJECTION, SOLUTION INTRAMUSCULAR at 09:51

## 2024-07-03 DIAGNOSIS — E53.8 VITAMIN B12 DEFICIENCY: ICD-10-CM

## 2024-07-03 RX ORDER — CYANOCOBALAMIN 1000 UG/ML
1000 INJECTION, SOLUTION INTRAMUSCULAR; SUBCUTANEOUS ONCE
Status: COMPLETED | OUTPATIENT
Start: 2024-07-08 | End: 2024-07-08

## 2024-07-08 ENCOUNTER — CLINICAL SUPPORT (OUTPATIENT)
Dept: PRIMARY CARE | Facility: CLINIC | Age: 64
End: 2024-07-08
Payer: COMMERCIAL

## 2024-07-08 DIAGNOSIS — E53.8 VITAMIN B12 DEFICIENCY: ICD-10-CM

## 2024-07-08 PROCEDURE — 96372 THER/PROPH/DIAG INJ SC/IM: CPT | Performed by: INTERNAL MEDICINE

## 2024-07-08 PROCEDURE — 2500000004 HC RX 250 GENERAL PHARMACY W/ HCPCS (ALT 636 FOR OP/ED): Performed by: INTERNAL MEDICINE

## 2024-07-10 ENCOUNTER — INFUSION (OUTPATIENT)
Dept: HEMATOLOGY/ONCOLOGY | Facility: CLINIC | Age: 64
End: 2024-07-10
Payer: COMMERCIAL

## 2024-07-10 DIAGNOSIS — C91.10 CHRONIC LYMPHOCYTIC LEUKEMIA (MULTI): ICD-10-CM

## 2024-07-10 DIAGNOSIS — C50.911 MALIGNANT NEOPLASM OF RIGHT BREAST IN FEMALE, ESTROGEN RECEPTOR NEGATIVE, UNSPECIFIED SITE OF BREAST (MULTI): ICD-10-CM

## 2024-07-10 DIAGNOSIS — Z85.3 PERSONAL HISTORY OF MALIGNANT NEOPLASM OF BREAST: ICD-10-CM

## 2024-07-10 DIAGNOSIS — Z15.01 BRCA GENE MUTATION TEST POSITIVE: ICD-10-CM

## 2024-07-10 DIAGNOSIS — Z15.09 BRCA GENE MUTATION TEST POSITIVE: ICD-10-CM

## 2024-07-10 DIAGNOSIS — Z17.1 MALIGNANT NEOPLASM OF RIGHT BREAST IN FEMALE, ESTROGEN RECEPTOR NEGATIVE, UNSPECIFIED SITE OF BREAST (MULTI): ICD-10-CM

## 2024-07-10 PROCEDURE — 2500000004 HC RX 250 GENERAL PHARMACY W/ HCPCS (ALT 636 FOR OP/ED): Mod: SE | Performed by: NURSE PRACTITIONER

## 2024-07-10 PROCEDURE — 96523 IRRIG DRUG DELIVERY DEVICE: CPT

## 2024-07-10 RX ORDER — HEPARIN SODIUM,PORCINE/PF 10 UNIT/ML
50 SYRINGE (ML) INTRAVENOUS AS NEEDED
OUTPATIENT
Start: 2024-07-10

## 2024-07-10 RX ORDER — HEPARIN 100 UNIT/ML
500 SYRINGE INTRAVENOUS AS NEEDED
Status: DISCONTINUED | OUTPATIENT
Start: 2024-07-10 | End: 2024-07-10 | Stop reason: HOSPADM

## 2024-07-10 RX ORDER — HEPARIN SODIUM,PORCINE/PF 10 UNIT/ML
50 SYRINGE (ML) INTRAVENOUS AS NEEDED
Status: DISCONTINUED | OUTPATIENT
Start: 2024-07-10 | End: 2024-07-10 | Stop reason: HOSPADM

## 2024-07-10 RX ORDER — HEPARIN 100 UNIT/ML
500 SYRINGE INTRAVENOUS AS NEEDED
OUTPATIENT
Start: 2024-07-10

## 2024-07-12 ENCOUNTER — APPOINTMENT (OUTPATIENT)
Dept: HEMATOLOGY/ONCOLOGY | Facility: CLINIC | Age: 64
End: 2024-07-12
Payer: COMMERCIAL

## 2024-07-30 ENCOUNTER — OFFICE VISIT (OUTPATIENT)
Dept: PRIMARY CARE | Facility: CLINIC | Age: 64
End: 2024-07-30
Payer: COMMERCIAL

## 2024-07-30 VITALS
SYSTOLIC BLOOD PRESSURE: 130 MMHG | DIASTOLIC BLOOD PRESSURE: 80 MMHG | WEIGHT: 105 LBS | BODY MASS INDEX: 17.02 KG/M2 | HEART RATE: 69 BPM | OXYGEN SATURATION: 98 % | TEMPERATURE: 97.4 F

## 2024-07-30 DIAGNOSIS — Z00.00 PHYSICAL EXAM: Primary | ICD-10-CM

## 2024-07-30 DIAGNOSIS — E87.1 HYPONATREMIA: ICD-10-CM

## 2024-07-30 DIAGNOSIS — Z12.83 SKIN EXAM, SCREENING FOR CANCER: ICD-10-CM

## 2024-07-30 DIAGNOSIS — F98.8 ATTENTION DEFICIT DISORDER, UNSPECIFIED HYPERACTIVITY PRESENCE: ICD-10-CM

## 2024-07-30 DIAGNOSIS — E46 PROTEIN-CALORIE MALNUTRITION, UNSPECIFIED SEVERITY (MULTI): ICD-10-CM

## 2024-07-30 DIAGNOSIS — R53.82 CHRONIC FATIGUE: ICD-10-CM

## 2024-07-30 DIAGNOSIS — H04.129 DRY EYE: ICD-10-CM

## 2024-07-30 DIAGNOSIS — J43.9 PULMONARY EMPHYSEMA, UNSPECIFIED EMPHYSEMA TYPE (MULTI): ICD-10-CM

## 2024-07-30 DIAGNOSIS — H91.93 DECREASED HEARING OF BOTH EARS: ICD-10-CM

## 2024-07-30 PROCEDURE — 99387 INIT PM E/M NEW PAT 65+ YRS: CPT | Performed by: INTERNAL MEDICINE

## 2024-07-30 PROCEDURE — 1126F AMNT PAIN NOTED NONE PRSNT: CPT | Performed by: INTERNAL MEDICINE

## 2024-07-30 PROCEDURE — 1159F MED LIST DOCD IN RCRD: CPT | Performed by: INTERNAL MEDICINE

## 2024-07-30 PROCEDURE — 1124F ACP DISCUSS-NO DSCNMKR DOCD: CPT | Performed by: INTERNAL MEDICINE

## 2024-07-30 PROCEDURE — G0439 PPPS, SUBSEQ VISIT: HCPCS | Performed by: INTERNAL MEDICINE

## 2024-07-30 PROCEDURE — 4004F PT TOBACCO SCREEN RCVD TLK: CPT | Performed by: INTERNAL MEDICINE

## 2024-07-30 PROCEDURE — 99215 OFFICE O/P EST HI 40 MIN: CPT | Performed by: INTERNAL MEDICINE

## 2024-07-30 ASSESSMENT — PATIENT HEALTH QUESTIONNAIRE - PHQ9
2. FEELING DOWN, DEPRESSED OR HOPELESS: NOT AT ALL
SUM OF ALL RESPONSES TO PHQ9 QUESTIONS 1 AND 2: 0
1. LITTLE INTEREST OR PLEASURE IN DOING THINGS: NOT AT ALL

## 2024-07-30 ASSESSMENT — PAIN SCALES - GENERAL: PAINLEVEL: 0-NO PAIN

## 2024-07-30 NOTE — PROGRESS NOTES
Outpatient Visit Note    Chief Complaint   Patient presents with    Annual Exam       HPI:  Erin Ramachandran is a 64 y.o. female here  for a Medicare Wellness Visit.  Pt has multiple concerns.   She has COPD and has had s/e to various inhalers. Pt is requesting a referral for acupuncture and dietary info to help with her COPD.  Pt is c/o fatigue.  She has dry eyes. She has been using eye drops. She had an appt with an eye specialist, but had to reschedule the appt, but it's months away.  Her last labs showed low sodium and she would like It repeated.  She feels as if her hearing is diminished and would like a hearing eval.  She is asking for a referral for a full body skin exam.  She is also finding it hard to concentrate and complete tasks. She is possibly interested in Adderall treatment. She would like a referral to psychiatry for evaluation.            Health Maintenance  Immunizations: declines Prevnar 20    + smoking, no illicit drug use, drinks a few beers a week. Patient reports routine vision checks and dental cleanings.    Screening cologuard done in 7/2022 with 3 year clearance. Next due 2025. Screening pap N/A. Screening mammogram s/p mastectomy. DEXA done 11/2023.     Hearing screen: reports difficulty with hearing     Does the patient use opioid medications: No    How does the patient rate their health status today: fair    Cognitive Screen:  AAAx3  to person, place and time: Yes  3 word recall: banana, chair, sunshine - Immediate recall: Yes    - 5 minutes recall: Yes   Impression: No cognitive deficiency observed during screening or encounter today        Reviewed:   Past Medical History/Allergies:  Yes  Family History:  Yes  Social History:  Yes  Current Medications:  Yes  Vital Signs:  Yes  Advanced Directives:  discussed  Immunizations:  reviewed today  Home Safety:                    Up & Go test > 30 seconds?  No                   Home have rugs; lack grab bars in bathroom; lack  handrail on stairs; have poor lighting?  No                   Hearing difficulties?  yes  Geriatric Assessment                   ADL areas requiring assistance:  Does not need help with Dressing, Eating, Ambulating, Toileting, Grooming, Hygiene.                    IADL areas requiring assistance:  Does not need help with Shopping, Housework, Accounting, Transportation, Driving.   Medications: reviewed  Current supplements:  Reviewed and recorded.   Other providers: Reviewed and recorded                Past Medical History:   Diagnosis Date    Anxiety     Awareness under anesthesia     Breast cancer (Multi)     Bilateral    Chronic lymphocytic leukemia (Multi)     COPD (chronic obstructive pulmonary disease) (Multi)     Fibromyalgia     Neuropathy         Current Medications  Current Outpatient Medications   Medication Instructions    acetaminophen (TYLENOL) 1,000 mg, oral, Every 6 hours PRN    fluocinonide 0.05 % cream Topical, As needed    hydrOXYzine pamoate (VISTARIL) 25 mg, oral, Every 8 hours PRN    loratadine (CLARITIN) 10 mg, oral, Daily        Allergies  Allergies   Allergen Reactions    Azithromycin Rash and Other     stomach cramping    Penicillins Hives and Rash    Denosumab Other and Unknown     Marked Jaw and bone pain    Gabapentin Hives and Unknown    Magnesium Sulfate Unknown    Zoledronic Acid-Mannitol-Water Other and Unknown    Adhesive Tape-Silicones Itching and Rash     Surgical glue    Other Itching, Swelling and Rash     Metals (As Environmental Allergen)    Sulfa (Sulfonamide Antibiotics) Swelling        Immunizations  Immunization History   Administered Date(s) Administered    Pfizer Purple Cap SARS-CoV-2 05/06/2021        Past Surgical History:   Procedure Laterality Date    BREAST LUMPECTOMY Right     COLONOSCOPY      HYSTERECTOMY  11/11/2013    With BSO    MASTECTOMY Bilateral 11/11/2013    Breast Surgery Mastectomy    MEDIPORT      TONSILLECTOMY  11/11/2013    Tonsillectomy    US GUIDED  THYROID BIOPSY  02/20/2012    US GUIDED THYROID BIOPSY [facility]     Family History   Problem Relation Name Age of Onset    Osteoporosis Mother      Ovarian cancer Sister      Arthritis Other FamHx      Social History     Tobacco Use    Smoking status: Every Day     Current packs/day: 1.00     Average packs/day: 1 pack/day for 51.6 years (51.6 ttl pk-yrs)     Types: Cigarettes     Start date: 1973    Smokeless tobacco: Never    Tobacco comments:     Now smokes 5 a day    Vaping Use    Vaping status: Never Used   Substance Use Topics    Alcohol use: Yes     Alcohol/week: 3.0 standard drinks of alcohol     Types: 3 Cans of beer per week     Comment: social    Drug use: Yes     Frequency: 1.0 times per week     Types: Marijuana     Comment: GUMMIES     Tobacco Use: High Risk (7/30/2024)    Patient History     Smoking Tobacco Use: Every Day     Smokeless Tobacco Use: Never     Passive Exposure: Not on file        ROS  All pertinent positive symptoms are included in the history of present illness.  All other systems have been reviewed and are negative and noncontributory to this patient's current ailments.    VITAL SIGNS  Vitals:    07/30/24 1015   BP: 130/80   Pulse: 69   Temp: 36.3 °C (97.4 °F)   SpO2: 98%     Vitals:    07/30/24 1015   Weight: 47.6 kg (105 lb)      Body mass index is 17.02 kg/m².     PHYSICAL EXAM  GENERAL APPEARANCE: underweight, well developed, looks like stated age, in no acute distress, not ill or tired appearing, conversing well.   HEENT: no trauma, normocephalic. PERRLA and EOMI with normal external exam. TM's intact with no injection or effusion, no signs of infection. Nares patent, turbinates pink without discharge. Pharynx pink with no exudates or lesions, no enlarged tonsils.   NECK: no nodes, supple without rigidity, no neck mass was observed, no thyromegaly or thyroid nodules.   HEART: regular rate and rhythm, S1 and S2 heard with no murmurs or skipped beats, no carotid bruits.    LUNGS: clear to auscultation bilaterally with no wheezes, crackles or rales.   ABDOMEN: no organomegaly, soft, nontender, nondistended, normal bowel sounds, no guarding/rebound/rigidity.   EXTREMITIES: moving all extremities equally with no edema or deformities.   SKIN: normal skin color and pigmentation, normal skin turgor without rash, lesions, or nodules visualized.   NEUROLOGIC EXAM: CN II-XII grossly intact, normal gait, normal balance, 5/5 muscle strength, sensation grossly intact.   PSYCH: mood and affect appropriate; alert and oriented to time, place, person; no difficulty with speech or language.   LYMPH NODES: no cervical lymphadenopathy.         Assessment/Plan   Diagnoses and all orders for this visit:  Physical exam  -     Lipid panel; Future  Pulmonary emphysema, unspecified emphysema type (Multi)  -     Referral to [facility]; Future  -     Referral to [facility]; Future  Hyponatremia  -     Basic metabolic panel; Future  Attention deficit disorder, unspecified hyperactivity presence  -     Referral to Psychiatry; Future  Chronic fatigue  -     TSH; Future  -     Vitamin B12; Future  Skin exam, screening for cancer  -     Referral to Dermatology  Dry eye  Comments:  follow up with eye specialist  Decreased hearing of both ears  -     Referral to Audiology; Future  Protein-calorie malnutrition, unspecified severity (Multi)  Comments:  Increased nutrition encouraged      This was a shared decision making visit.    Next Wellness Exam Due  In 1 year from today      Teresa Knox MD   07/30/24   8:55 PM

## 2024-07-31 ENCOUNTER — TELEPHONE (OUTPATIENT)
Dept: PRIMARY CARE | Facility: CLINIC | Age: 64
End: 2024-07-31
Payer: COMMERCIAL

## 2024-07-31 DIAGNOSIS — Z12.83 SKIN EXAM, SCREENING FOR CANCER: Primary | ICD-10-CM

## 2024-08-06 DIAGNOSIS — E53.8 VITAMIN B12 DEFICIENCY: ICD-10-CM

## 2024-08-06 RX ORDER — CYANOCOBALAMIN 1000 UG/ML
1000 INJECTION, SOLUTION INTRAMUSCULAR; SUBCUTANEOUS ONCE
Status: COMPLETED | OUTPATIENT
Start: 2024-08-08 | End: 2024-08-08

## 2024-08-08 ENCOUNTER — CLINICAL SUPPORT (OUTPATIENT)
Dept: PRIMARY CARE | Facility: CLINIC | Age: 64
End: 2024-08-08
Payer: COMMERCIAL

## 2024-08-08 DIAGNOSIS — E53.8 VITAMIN B12 DEFICIENCY: ICD-10-CM

## 2024-08-08 PROCEDURE — 2500000004 HC RX 250 GENERAL PHARMACY W/ HCPCS (ALT 636 FOR OP/ED): Performed by: INTERNAL MEDICINE

## 2024-08-08 PROCEDURE — 96372 THER/PROPH/DIAG INJ SC/IM: CPT | Performed by: INTERNAL MEDICINE

## 2024-08-23 ENCOUNTER — INFUSION (OUTPATIENT)
Dept: HEMATOLOGY/ONCOLOGY | Facility: CLINIC | Age: 64
End: 2024-08-23
Payer: COMMERCIAL

## 2024-08-23 DIAGNOSIS — Z15.01 BRCA GENE MUTATION TEST POSITIVE: ICD-10-CM

## 2024-08-23 DIAGNOSIS — Z17.1 MALIGNANT NEOPLASM OF RIGHT BREAST IN FEMALE, ESTROGEN RECEPTOR NEGATIVE, UNSPECIFIED SITE OF BREAST (MULTI): ICD-10-CM

## 2024-08-23 DIAGNOSIS — Z15.09 BRCA GENE MUTATION TEST POSITIVE: ICD-10-CM

## 2024-08-23 DIAGNOSIS — R53.82 CHRONIC FATIGUE: ICD-10-CM

## 2024-08-23 DIAGNOSIS — C50.911 MALIGNANT NEOPLASM OF RIGHT BREAST IN FEMALE, ESTROGEN RECEPTOR NEGATIVE, UNSPECIFIED SITE OF BREAST (MULTI): ICD-10-CM

## 2024-08-23 DIAGNOSIS — Z85.3 PERSONAL HISTORY OF MALIGNANT NEOPLASM OF BREAST: ICD-10-CM

## 2024-08-23 DIAGNOSIS — Z00.00 PHYSICAL EXAM: ICD-10-CM

## 2024-08-23 DIAGNOSIS — C91.10 CHRONIC LYMPHOCYTIC LEUKEMIA (MULTI): ICD-10-CM

## 2024-08-23 DIAGNOSIS — E87.1 HYPONATREMIA: ICD-10-CM

## 2024-08-23 LAB
ANION GAP SERPL CALC-SCNC: 11 MMOL/L (ref 10–20)
BUN SERPL-MCNC: 10 MG/DL (ref 6–23)
CALCIUM SERPL-MCNC: 9 MG/DL (ref 8.6–10.3)
CHLORIDE SERPL-SCNC: 98 MMOL/L (ref 98–107)
CHOLEST SERPL-MCNC: 225 MG/DL (ref 0–199)
CHOLESTEROL/HDL RATIO: 2.5
CO2 SERPL-SCNC: 29 MMOL/L (ref 21–32)
CREAT SERPL-MCNC: 0.63 MG/DL (ref 0.5–1.05)
EGFRCR SERPLBLD CKD-EPI 2021: >90 ML/MIN/1.73M*2
GLUCOSE SERPL-MCNC: 95 MG/DL (ref 74–99)
HDLC SERPL-MCNC: 88.9 MG/DL
LDLC SERPL CALC-MCNC: 125 MG/DL
NON HDL CHOLESTEROL: 136 MG/DL (ref 0–149)
POTASSIUM SERPL-SCNC: 4 MMOL/L (ref 3.5–5.3)
SODIUM SERPL-SCNC: 134 MMOL/L (ref 136–145)
TRIGL SERPL-MCNC: 57 MG/DL (ref 0–149)
TSH SERPL-ACNC: 0.99 MIU/L (ref 0.44–3.98)
VIT B12 SERPL-MCNC: 663 PG/ML (ref 211–911)
VLDL: 11 MG/DL (ref 0–40)

## 2024-08-23 PROCEDURE — 82607 VITAMIN B-12: CPT

## 2024-08-23 PROCEDURE — 84443 ASSAY THYROID STIM HORMONE: CPT

## 2024-08-23 PROCEDURE — 80048 BASIC METABOLIC PNL TOTAL CA: CPT

## 2024-08-23 PROCEDURE — 2500000004 HC RX 250 GENERAL PHARMACY W/ HCPCS (ALT 636 FOR OP/ED): Mod: SE | Performed by: NURSE PRACTITIONER

## 2024-08-23 PROCEDURE — 36591 DRAW BLOOD OFF VENOUS DEVICE: CPT

## 2024-08-23 PROCEDURE — 80061 LIPID PANEL: CPT

## 2024-08-23 RX ORDER — HEPARIN 100 UNIT/ML
500 SYRINGE INTRAVENOUS AS NEEDED
Status: DISCONTINUED | OUTPATIENT
Start: 2024-08-23 | End: 2024-08-23 | Stop reason: HOSPADM

## 2024-08-23 RX ORDER — HEPARIN SODIUM,PORCINE/PF 10 UNIT/ML
50 SYRINGE (ML) INTRAVENOUS AS NEEDED
Status: DISCONTINUED | OUTPATIENT
Start: 2024-08-23 | End: 2024-08-23 | Stop reason: HOSPADM

## 2024-08-23 RX ORDER — HEPARIN 100 UNIT/ML
500 SYRINGE INTRAVENOUS AS NEEDED
OUTPATIENT
Start: 2024-08-23

## 2024-08-23 RX ORDER — HEPARIN SODIUM,PORCINE/PF 10 UNIT/ML
50 SYRINGE (ML) INTRAVENOUS AS NEEDED
OUTPATIENT
Start: 2024-08-23

## 2024-08-23 NOTE — PROGRESS NOTES
Patient here for blood draw from [facility]. Accessed, good blood return, sent blood to lab, flushed per orders. Denies questions or needs at this time.

## 2024-09-13 ENCOUNTER — APPOINTMENT (OUTPATIENT)
Dept: PULMONOLOGY | Facility: CLINIC | Age: 64
End: 2024-09-13
Payer: COMMERCIAL

## 2024-10-03 DIAGNOSIS — E53.8 B12 DEFICIENCY: ICD-10-CM

## 2024-10-03 RX ORDER — CYANOCOBALAMIN 1000 UG/ML
1000 INJECTION, SOLUTION INTRAMUSCULAR; SUBCUTANEOUS ONCE
Status: COMPLETED | OUTPATIENT
Start: 2024-10-04 | End: 2024-10-04

## 2024-10-04 ENCOUNTER — CLINICAL SUPPORT (OUTPATIENT)
Dept: PRIMARY CARE | Facility: CLINIC | Age: 64
End: 2024-10-04
Payer: COMMERCIAL

## 2024-10-04 DIAGNOSIS — E53.8 VITAMIN B12 DEFICIENCY: ICD-10-CM

## 2024-10-04 PROCEDURE — 2500000004 HC RX 250 GENERAL PHARMACY W/ HCPCS (ALT 636 FOR OP/ED): Performed by: INTERNAL MEDICINE

## 2024-10-04 PROCEDURE — 96372 THER/PROPH/DIAG INJ SC/IM: CPT | Performed by: INTERNAL MEDICINE

## 2024-10-10 ENCOUNTER — INFUSION (OUTPATIENT)
Dept: HEMATOLOGY/ONCOLOGY | Facility: CLINIC | Age: 64
End: 2024-10-10
Payer: COMMERCIAL

## 2024-10-10 DIAGNOSIS — C50.911 MALIGNANT NEOPLASM OF RIGHT BREAST IN FEMALE, ESTROGEN RECEPTOR NEGATIVE, UNSPECIFIED SITE OF BREAST: ICD-10-CM

## 2024-10-10 DIAGNOSIS — Z85.3 PERSONAL HISTORY OF MALIGNANT NEOPLASM OF BREAST: ICD-10-CM

## 2024-10-10 DIAGNOSIS — C91.10 CHRONIC LYMPHOCYTIC LEUKEMIA (MULTI): ICD-10-CM

## 2024-10-10 DIAGNOSIS — Z15.01 BRCA GENE MUTATION TEST POSITIVE: ICD-10-CM

## 2024-10-10 DIAGNOSIS — Z15.09 BRCA GENE MUTATION TEST POSITIVE: ICD-10-CM

## 2024-10-10 DIAGNOSIS — Z17.1 MALIGNANT NEOPLASM OF RIGHT BREAST IN FEMALE, ESTROGEN RECEPTOR NEGATIVE, UNSPECIFIED SITE OF BREAST: ICD-10-CM

## 2024-10-10 PROCEDURE — 96523 IRRIG DRUG DELIVERY DEVICE: CPT

## 2024-10-10 PROCEDURE — 2500000004 HC RX 250 GENERAL PHARMACY W/ HCPCS (ALT 636 FOR OP/ED): Mod: SE | Performed by: NURSE PRACTITIONER

## 2024-10-10 RX ORDER — HEPARIN SODIUM,PORCINE/PF 10 UNIT/ML
50 SYRINGE (ML) INTRAVENOUS AS NEEDED
OUTPATIENT
Start: 2024-10-10

## 2024-10-10 RX ORDER — HEPARIN 100 UNIT/ML
500 SYRINGE INTRAVENOUS AS NEEDED
Status: DISCONTINUED | OUTPATIENT
Start: 2024-10-10 | End: 2024-10-10 | Stop reason: HOSPADM

## 2024-10-10 RX ORDER — HEPARIN 100 UNIT/ML
500 SYRINGE INTRAVENOUS AS NEEDED
OUTPATIENT
Start: 2024-10-10

## 2024-10-10 RX ORDER — HEPARIN SODIUM,PORCINE/PF 10 UNIT/ML
50 SYRINGE (ML) INTRAVENOUS AS NEEDED
Status: DISCONTINUED | OUTPATIENT
Start: 2024-10-10 | End: 2024-10-10 | Stop reason: HOSPADM

## 2024-11-05 DIAGNOSIS — E53.8 VITAMIN B12 DEFICIENCY: ICD-10-CM

## 2024-11-05 RX ORDER — CYANOCOBALAMIN 1000 UG/ML
1000 INJECTION, SOLUTION INTRAMUSCULAR; SUBCUTANEOUS ONCE
Status: COMPLETED | OUTPATIENT
Start: 2024-11-05 | End: 2024-11-06

## 2024-11-06 ENCOUNTER — CLINICAL SUPPORT (OUTPATIENT)
Dept: PRIMARY CARE | Facility: CLINIC | Age: 64
End: 2024-11-06
Payer: COMMERCIAL

## 2024-11-06 DIAGNOSIS — E53.8 B12 DEFICIENCY: ICD-10-CM

## 2024-11-06 PROCEDURE — 96372 THER/PROPH/DIAG INJ SC/IM: CPT | Performed by: INTERNAL MEDICINE

## 2024-11-06 PROCEDURE — 2500000004 HC RX 250 GENERAL PHARMACY W/ HCPCS (ALT 636 FOR OP/ED): Performed by: INTERNAL MEDICINE

## 2024-11-11 ENCOUNTER — APPOINTMENT (OUTPATIENT)
Dept: OPHTHALMOLOGY | Facility: CLINIC | Age: 64
End: 2024-11-11
Payer: COMMERCIAL

## 2024-11-11 DIAGNOSIS — H02.88A MEIBOMIAN GLAND DYSFUNCTION (MGD) OF UPPER AND LOWER LIDS OF BOTH EYES: Primary | ICD-10-CM

## 2024-11-11 DIAGNOSIS — H04.123 DRY EYE SYNDROME OF LACRIMAL GLAND, BILATERAL: ICD-10-CM

## 2024-11-11 DIAGNOSIS — H02.88B MEIBOMIAN GLAND DYSFUNCTION (MGD) OF UPPER AND LOWER LIDS OF BOTH EYES: Primary | ICD-10-CM

## 2024-11-11 PROBLEM — H52.223 REGULAR ASTIGMATISM WITH PRESBYOPIA, BILATERAL: Status: ACTIVE | Noted: 2024-11-11

## 2024-11-11 PROBLEM — H26.9 CATARACT: Status: RESOLVED | Noted: 2023-09-19 | Resolved: 2024-11-11

## 2024-11-11 PROBLEM — H52.4 REGULAR ASTIGMATISM WITH PRESBYOPIA, BILATERAL: Status: ACTIVE | Noted: 2024-11-11

## 2024-11-11 PROBLEM — Z97.3 WEARS GLASSES: Status: RESOLVED | Noted: 2023-09-19 | Resolved: 2024-11-11

## 2024-11-11 PROBLEM — S05.02XA CORNEAL ABRASION, LEFT: Status: RESOLVED | Noted: 2023-09-19 | Resolved: 2024-11-11

## 2024-11-11 PROCEDURE — 1159F MED LIST DOCD IN RCRD: CPT | Performed by: STUDENT IN AN ORGANIZED HEALTH CARE EDUCATION/TRAINING PROGRAM

## 2024-11-11 PROCEDURE — 99213 OFFICE O/P EST LOW 20 MIN: CPT | Performed by: STUDENT IN AN ORGANIZED HEALTH CARE EDUCATION/TRAINING PROGRAM

## 2024-11-11 PROCEDURE — 83861 MICROFLUID ANALY TEARS: CPT | Performed by: STUDENT IN AN ORGANIZED HEALTH CARE EDUCATION/TRAINING PROGRAM

## 2024-11-11 PROCEDURE — 83516 IMMUNOASSAY NONANTIBODY: CPT | Performed by: STUDENT IN AN ORGANIZED HEALTH CARE EDUCATION/TRAINING PROGRAM

## 2024-11-11 RX ORDER — PERFLUOROHEXYLOCTANE 1 MG/MG
1 SOLUTION OPHTHALMIC 4 TIMES DAILY
Qty: 3 ML | Refills: 6 | Status: SHIPPED | OUTPATIENT
Start: 2024-11-11 | End: 2024-12-11

## 2024-11-11 RX ORDER — FLUOROMETHOLONE 1 MG/ML
1 SUSPENSION/ DROPS OPHTHALMIC 2 TIMES DAILY
Qty: 10 ML | Refills: 0 | Status: SHIPPED | OUTPATIENT
Start: 2024-11-11 | End: 2024-12-09

## 2024-11-11 ASSESSMENT — ENCOUNTER SYMPTOMS
MUSCULOSKELETAL NEGATIVE: 0
RESPIRATORY NEGATIVE: 0
CONSTITUTIONAL NEGATIVE: 0
HEMATOLOGIC/LYMPHATIC NEGATIVE: 0
NEUROLOGICAL NEGATIVE: 0
ALLERGIC/IMMUNOLOGIC NEGATIVE: 0
EYES NEGATIVE: 1
PSYCHIATRIC NEGATIVE: 0
CARDIOVASCULAR NEGATIVE: 0
GASTROINTESTINAL NEGATIVE: 0
ENDOCRINE NEGATIVE: 0

## 2024-11-11 ASSESSMENT — CONF VISUAL FIELD
OS_SUPERIOR_NASAL_RESTRICTION: 0
OD_SUPERIOR_NASAL_RESTRICTION: 0
OD_INFERIOR_TEMPORAL_RESTRICTION: 0
OS_SUPERIOR_TEMPORAL_RESTRICTION: 0
OS_NORMAL: 1
OD_INFERIOR_NASAL_RESTRICTION: 0
OS_INFERIOR_NASAL_RESTRICTION: 0
OD_NORMAL: 1
METHOD: COUNTING FINGERS
OD_SUPERIOR_TEMPORAL_RESTRICTION: 0
OS_INFERIOR_TEMPORAL_RESTRICTION: 0

## 2024-11-11 ASSESSMENT — VISUAL ACUITY
OS_CC: 20/30
OD_PH_CC: 20/30
METHOD: SNELLEN - LINEAR
OD_CC: 20/40
CORRECTION_TYPE: GLASSES
OS_PH_CC+: -2
OS_PH_CC: 20/25

## 2024-11-11 ASSESSMENT — EXTERNAL EXAM - RIGHT EYE: OD_EXAM: NORMAL

## 2024-11-11 ASSESSMENT — TONOMETRY
OS_IOP_MMHG: 22
IOP_METHOD: GOLDMANN APPLANATION
OD_IOP_MMHG: 21

## 2024-11-11 ASSESSMENT — SCHIRMERS TEST
OD_SCHIRMERS: 8
OS_SCHIRMERS: 5

## 2024-11-11 ASSESSMENT — EXTERNAL EXAM - LEFT EYE: OS_EXAM: NORMAL

## 2024-11-11 NOTE — PROGRESS NOTES
Assessment/Plan   Diagnoses and all orders for this visit:  Meibomian gland dysfunction (MGD) of upper and lower lids of both eyes  Dry eye syndrome of lacrimal gland, bilateral  -Patient currently using OTC Lipid and Aqeuous based Artificial tears and Failing with continued signs and symptoms. Failure with OTC AT's as a monotherapy  -symptoms of visual fluctuations, light sensitivity, irritation  Testing to evaluate the presence of dry eye disease (DED) was performed today and provided the following results:  Inflammadry, a test for the DED specific MMP-9 inhibitor:  OD:  (+)strong positive   OS:  (+)strong positive   TearLab, a test for tear film osmolarity revealed (normal <300 mOsm/L, mild 300-320, moderate 320-340, severe >340 mOsm/L, inter eye difference of >8 mOsm/L is considered abnormal as well)  OD: 292  mOsm/L  OS:  324 mOsm/L  Schirmer`s test with anesthetic, testing basal tear production (0-5 = severe, 6-10 = moderate, 11-15 = mild):  OD: 8 mm between 1-6 minutes  OS: 5  mm between 1-6 minutes  Tear break up time (TBUT), a measure of tear stability (0-5 = severe, 6-10 = moderate, 11-15 = mild)  OD: 3  seconds  OS: 3  seconds  Corneal punctate epithelial erosions (PEE) staining with sodium fluorescein score (5 zones, each PEE level 0-3, maximum score = 15)  OD: 0  OS: 1  Meibomian gland disease (Efron scale 0-4, 0: no abnormality, 4: thick creamy yellow expression at all gland orifices, expression continuous, conjunctival redness):  OD: 2  OS: 2    Signs on exam appear evaporative>lacrimal    TXT Plan: Start Miebo QID OU, Refresh or Systane Gel Tears at bedtime, warm compresses 5 minutes per day, and FML BID for 4 weeks    Miebo RX'd as medically necessary for failure with AT's and reduced TBUT as above  Recommend starting Miebo (perfluorohexyloctane) qid OU for evaporative dry eye. The patient has tried and failed alternative treatments of artificial tears (lipid based) and warm compresses. This  prescription is being prescribed for an FDA approved reason.   Rx sent to [facility] (Radha Dugan).     Addendum:  Patient reports FML is making her eyes feel worse. Will discontinue and move forward with Miebo.   12/6/24: patient messaged stating that Miebo has been giving her discomfort as well. She is still not having relief from drops. (Started taking Miebo on 11/20/24). Patient reports she has discontinued due to irritation with Miebo.    Will switch to Restasis at this time.     F/u 1/13/25    RTC 2-3 months for a dry eye f/u with inflammadry and tear lab

## 2024-11-25 RX ORDER — HEPARIN 100 UNIT/ML
500 SYRINGE INTRAVENOUS AS NEEDED
Status: CANCELLED | OUTPATIENT
Start: 2024-11-27

## 2024-11-25 RX ORDER — HEPARIN SODIUM,PORCINE/PF 10 UNIT/ML
50 SYRINGE (ML) INTRAVENOUS AS NEEDED
Status: CANCELLED | OUTPATIENT
Start: 2024-11-27

## 2024-11-27 ENCOUNTER — APPOINTMENT (OUTPATIENT)
Dept: HEMATOLOGY/ONCOLOGY | Facility: CLINIC | Age: 64
End: 2024-11-27
Payer: COMMERCIAL

## 2024-11-27 ENCOUNTER — INFUSION (OUTPATIENT)
Dept: HEMATOLOGY/ONCOLOGY | Facility: CLINIC | Age: 64
End: 2024-11-27
Payer: COMMERCIAL

## 2024-11-27 DIAGNOSIS — Z85.3 PERSONAL HISTORY OF MALIGNANT NEOPLASM OF BREAST: ICD-10-CM

## 2024-11-27 DIAGNOSIS — Z17.1 MALIGNANT NEOPLASM OF RIGHT BREAST IN FEMALE, ESTROGEN RECEPTOR NEGATIVE, UNSPECIFIED SITE OF BREAST: ICD-10-CM

## 2024-11-27 DIAGNOSIS — Z15.09 BRCA GENE MUTATION TEST POSITIVE: ICD-10-CM

## 2024-11-27 DIAGNOSIS — Z15.01 BRCA GENE MUTATION TEST POSITIVE: ICD-10-CM

## 2024-11-27 DIAGNOSIS — C91.10 CHRONIC LYMPHOCYTIC LEUKEMIA (MULTI): Primary | ICD-10-CM

## 2024-11-27 DIAGNOSIS — C50.911 MALIGNANT NEOPLASM OF RIGHT BREAST IN FEMALE, ESTROGEN RECEPTOR NEGATIVE, UNSPECIFIED SITE OF BREAST: ICD-10-CM

## 2024-11-27 LAB
ALBUMIN SERPL BCP-MCNC: 4.2 G/DL (ref 3.4–5)
ALP SERPL-CCNC: 65 U/L (ref 33–136)
ALT SERPL W P-5'-P-CCNC: 11 U/L (ref 7–45)
ANION GAP SERPL CALC-SCNC: 12 MMOL/L (ref 10–20)
AST SERPL W P-5'-P-CCNC: 13 U/L (ref 9–39)
BASOPHILS # BLD AUTO: 0.07 X10*3/UL (ref 0–0.1)
BASOPHILS NFR BLD AUTO: 1.1 %
BILIRUB SERPL-MCNC: 0.6 MG/DL (ref 0–1.2)
BUN SERPL-MCNC: 10 MG/DL (ref 6–23)
CALCIUM SERPL-MCNC: 9.1 MG/DL (ref 8.6–10.3)
CHLORIDE SERPL-SCNC: 97 MMOL/L (ref 98–107)
CO2 SERPL-SCNC: 27 MMOL/L (ref 21–32)
CREAT SERPL-MCNC: 0.68 MG/DL (ref 0.5–1.05)
EGFRCR SERPLBLD CKD-EPI 2021: >90 ML/MIN/1.73M*2
EOSINOPHIL # BLD AUTO: 0.41 X10*3/UL (ref 0–0.7)
EOSINOPHIL NFR BLD AUTO: 6.5 %
ERYTHROCYTE [DISTWIDTH] IN BLOOD BY AUTOMATED COUNT: 12.4 % (ref 11.5–14.5)
FERRITIN SERPL-MCNC: 101 NG/ML (ref 8–150)
FOLATE SERPL-MCNC: 14.6 NG/ML
GLUCOSE SERPL-MCNC: 95 MG/DL (ref 74–99)
HCT VFR BLD AUTO: 43.1 % (ref 36–46)
HGB BLD-MCNC: 14.6 G/DL (ref 12–16)
IGA SERPL-MCNC: 157 MG/DL (ref 70–400)
IGG SERPL-MCNC: 1000 MG/DL (ref 700–1600)
IGM SERPL-MCNC: 60 MG/DL (ref 40–230)
IMM GRANULOCYTES # BLD AUTO: 0.02 X10*3/UL (ref 0–0.7)
IMM GRANULOCYTES NFR BLD AUTO: 0.3 % (ref 0–0.9)
IRON SATN MFR SERPL: 48 % (ref 25–45)
IRON SERPL-MCNC: 175 UG/DL (ref 35–150)
LDH SERPL L TO P-CCNC: 147 U/L (ref 84–246)
LYMPHOCYTES # BLD AUTO: 1.71 X10*3/UL (ref 1.2–4.8)
LYMPHOCYTES NFR BLD AUTO: 27.3 %
MCH RBC QN AUTO: 31.1 PG (ref 26–34)
MCHC RBC AUTO-ENTMCNC: 33.9 G/DL (ref 32–36)
MCV RBC AUTO: 92 FL (ref 80–100)
MONOCYTES # BLD AUTO: 0.49 X10*3/UL (ref 0.1–1)
MONOCYTES NFR BLD AUTO: 7.8 %
NEUTROPHILS # BLD AUTO: 3.56 X10*3/UL (ref 1.2–7.7)
NEUTROPHILS NFR BLD AUTO: 57 %
NRBC BLD-RTO: 0 /100 WBCS (ref 0–0)
PLATELET # BLD AUTO: 322 X10*3/UL (ref 150–450)
POTASSIUM SERPL-SCNC: 4 MMOL/L (ref 3.5–5.3)
PROT SERPL-MCNC: 6.8 G/DL (ref 6.4–8.2)
RBC # BLD AUTO: 4.69 X10*6/UL (ref 4–5.2)
SODIUM SERPL-SCNC: 132 MMOL/L (ref 136–145)
TIBC SERPL-MCNC: 363 UG/DL (ref 240–445)
UIBC SERPL-MCNC: 188 UG/DL (ref 110–370)
VIT B12 SERPL-MCNC: 556 PG/ML (ref 211–911)
WBC # BLD AUTO: 6.3 X10*3/UL (ref 4.4–11.3)

## 2024-11-27 PROCEDURE — 82607 VITAMIN B-12: CPT

## 2024-11-27 PROCEDURE — 2500000004 HC RX 250 GENERAL PHARMACY W/ HCPCS (ALT 636 FOR OP/ED): Performed by: NURSE PRACTITIONER

## 2024-11-27 PROCEDURE — 82784 ASSAY IGA/IGD/IGG/IGM EACH: CPT

## 2024-11-27 PROCEDURE — 85025 COMPLETE CBC W/AUTO DIFF WBC: CPT

## 2024-11-27 PROCEDURE — 36591 DRAW BLOOD OFF VENOUS DEVICE: CPT

## 2024-11-27 PROCEDURE — 80053 COMPREHEN METABOLIC PANEL: CPT

## 2024-11-27 PROCEDURE — 82728 ASSAY OF FERRITIN: CPT

## 2024-11-27 PROCEDURE — 83550 IRON BINDING TEST: CPT

## 2024-11-27 PROCEDURE — 82746 ASSAY OF FOLIC ACID SERUM: CPT

## 2024-11-27 PROCEDURE — 83615 LACTATE (LD) (LDH) ENZYME: CPT

## 2024-11-27 RX ORDER — HEPARIN SODIUM,PORCINE/PF 10 UNIT/ML
50 SYRINGE (ML) INTRAVENOUS AS NEEDED
Status: DISCONTINUED | OUTPATIENT
Start: 2024-11-27 | End: 2024-11-27 | Stop reason: HOSPADM

## 2024-11-27 RX ORDER — HEPARIN 100 UNIT/ML
500 SYRINGE INTRAVENOUS AS NEEDED
OUTPATIENT
Start: 2024-11-27

## 2024-11-27 RX ORDER — HEPARIN 100 UNIT/ML
500 SYRINGE INTRAVENOUS AS NEEDED
Status: DISCONTINUED | OUTPATIENT
Start: 2024-11-27 | End: 2024-11-27 | Stop reason: HOSPADM

## 2024-11-27 RX ORDER — HEPARIN SODIUM,PORCINE/PF 10 UNIT/ML
50 SYRINGE (ML) INTRAVENOUS AS NEEDED
OUTPATIENT
Start: 2024-11-27

## 2024-12-02 ENCOUNTER — OFFICE VISIT (OUTPATIENT)
Dept: HEMATOLOGY/ONCOLOGY | Facility: CLINIC | Age: 64
End: 2024-12-02
Payer: COMMERCIAL

## 2024-12-02 VITALS
WEIGHT: 108.58 LBS | HEART RATE: 93 BPM | OXYGEN SATURATION: 94 % | RESPIRATION RATE: 17 BRPM | BODY MASS INDEX: 17.6 KG/M2 | TEMPERATURE: 97.5 F | SYSTOLIC BLOOD PRESSURE: 181 MMHG | DIASTOLIC BLOOD PRESSURE: 76 MMHG

## 2024-12-02 DIAGNOSIS — C91.10 CHRONIC LYMPHOCYTIC LEUKEMIA (MULTI): Primary | ICD-10-CM

## 2024-12-02 PROCEDURE — 1159F MED LIST DOCD IN RCRD: CPT | Performed by: INTERNAL MEDICINE

## 2024-12-02 PROCEDURE — 99215 OFFICE O/P EST HI 40 MIN: CPT | Performed by: INTERNAL MEDICINE

## 2024-12-02 PROCEDURE — 1125F AMNT PAIN NOTED PAIN PRSNT: CPT | Performed by: INTERNAL MEDICINE

## 2024-12-02 PROCEDURE — 1160F RVW MEDS BY RX/DR IN RCRD: CPT | Performed by: INTERNAL MEDICINE

## 2024-12-02 ASSESSMENT — COLUMBIA-SUICIDE SEVERITY RATING SCALE - C-SSRS
1. IN THE PAST MONTH, HAVE YOU WISHED YOU WERE DEAD OR WISHED YOU COULD GO TO SLEEP AND NOT WAKE UP?: NO
2. HAVE YOU ACTUALLY HAD ANY THOUGHTS OF KILLING YOURSELF?: NO
6. HAVE YOU EVER DONE ANYTHING, STARTED TO DO ANYTHING, OR PREPARED TO DO ANYTHING TO END YOUR LIFE?: NO

## 2024-12-02 ASSESSMENT — PAIN SCALES - GENERAL: PAINLEVEL_OUTOF10: 3

## 2024-12-02 NOTE — PROGRESS NOTES
ID Statement:   ELIF COLON is a 64 year old Female    Treatment Synopsis:  1. Breast cancer initially stage I on the right breast diagnosed in 2002, status post four cycles of Adriamycin and Cytoxan. ER negative, UT negative, HER-2/jose angel negative.  2. Status post right lumpectomy, axillary lymph node dissection in the right breast with radiation in 2002.  3. Left-sided breast cancer in 2005, status post bilateral mastectomies and four cycles of Taxotere and left axillary lymph node dissection.  4. Status post total abdominal hysterectomy and bilateral salpingo-oophorectomy in February of 2006.  5. BRCA 1 positive.  6. Lateral chest wall lesion excised in October of 2006 that was pathologically negative.  7. CLL/SLL stage IA with good prognostic markers of normal cytogenetics, CD38 negative, ZAP-70 negative diagnosed by right submandibular lymph node excisional biopsy in May of 2012.  8. Tobacco abuse.    Interval History:  Patient returns today for routine one-year followup evaluation for history of a stage I right-sided breast cancer, diagnosed in 2002, now status post bilateral mastectomies and four cycles of Taxotere as well as history of CLL/SLL, diagnosed by right submandibular lymph node excisional biopsy May 2012.     On presentation today, she denies any fevers or chills. No cough, chest pain or shortness of breath. No nausea or vomiting. No constipation or diarrhea. She has ongoing pruritus which has been an issue for some time now. She takes lyrica and hydroxyzine as needed. She has some neuropathic pain in her wrists bilaterally, likely carpal syndrome, worse on her left arm. She has lost about 6 pounds in the past 6 months.     The patient does still have her right Mediport in place. This was exchanged on April 11, 2023.  This is being flushed on an every six-week basis. She has had several ports since her original breast cancer diagnosis and bilateral mastectomies.     Review of Systems:  A review of  systems has been completed and are negative for complaints except what is stated in the HPI and/or past medical history    Allergies:  Reviewed in EMR    Medications:  Medication list reviewed with patient and updated in EMR    Social History:  smokes about 1/4 pack per day  social alcohol   occasional marijuana     Vital Signs:  /82, pulse 98, resp 18, temp 36.5  Weight 46.8    Physical Exam:  ECO  Pain: chronic back pain  Not done    Labs:  reviewed    Assessment:  1- BRCA1 positive:    Hx of bilateral breast cancer in  and , both stage 1A, s/p bilateral mastectomies and PPX JH/BSO.    No need for imaging.    Colonoscopy was negative in , and she wants to continue cologuard q 3 years. She continues annual dermatology exams, due in 2025     2- SLL stage IA: SLL stage IA with good prognostic markers of normal cytogenetics, CD38 negative, ZAP-70 negative diagnosed by right submandibular lymph node excisional biopsy in May of 2012.   CT CAP  [facility]. No need to do imaging unless clinically indicated  Labs:WNL, no evidence of CLL in peripheral blood  LN bx report from  is reviewed. 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 Ddx includes CLL/SLL or MZL. Clinical correlation is recommended. Since CBC is normal without atypical lymphocytes and CT CAP does not show any LAP, CLL/SLL are unlikely. It is more consistent with MZL.    Mediport:   - continues to flush Mediport every six weeks.    Plan:  - 6 months follow-up with labs  - every six week Mediport flush.

## 2024-12-02 NOTE — PROGRESS NOTES
Pt seen in office today for an established patient/ new to provider follow up visit with Dr. Michelle Jimenez for management of her breast cancer.She was last seen in our office on 11/28/23 by Francisco Muller CNP. She is without complaints today and denies pain.     Medications, pharmacy preference and allergies were reviewed with patient and updated in the medical record by MD.     Per orders, labs were obtained on 11/27/24 and results are to be reviewed in office today by MD with patient.She is to RTC in 6 months with labs prior to her visit.    Our contact information was given to patient and they were encouraged to contact us with any questions or concerns.     Patient verbalized understanding and agreement regarding discussed information via verbal feedback. Pt escorted to scheduling.

## 2024-12-03 DIAGNOSIS — E53.8 VITAMIN B12 DEFICIENCY: ICD-10-CM

## 2024-12-03 RX ORDER — CYANOCOBALAMIN 1000 UG/ML
1000 INJECTION, SOLUTION INTRAMUSCULAR; SUBCUTANEOUS ONCE
Status: COMPLETED | OUTPATIENT
Start: 2024-12-06 | End: 2024-12-06

## 2024-12-06 ENCOUNTER — CLINICAL SUPPORT (OUTPATIENT)
Dept: PRIMARY CARE | Facility: CLINIC | Age: 64
End: 2024-12-06
Payer: COMMERCIAL

## 2024-12-06 DIAGNOSIS — E53.8 B12 DEFICIENCY: ICD-10-CM

## 2024-12-06 PROCEDURE — 2500000004 HC RX 250 GENERAL PHARMACY W/ HCPCS (ALT 636 FOR OP/ED): Performed by: INTERNAL MEDICINE

## 2024-12-06 PROCEDURE — 96372 THER/PROPH/DIAG INJ SC/IM: CPT | Performed by: INTERNAL MEDICINE

## 2025-01-06 DIAGNOSIS — E53.8 VITAMIN B12 DEFICIENCY: ICD-10-CM

## 2025-01-06 DIAGNOSIS — C91.10 CHRONIC LYMPHOCYTIC LEUKEMIA (MULTI): ICD-10-CM

## 2025-01-06 RX ORDER — CYANOCOBALAMIN 1000 UG/ML
1000 INJECTION, SOLUTION INTRAMUSCULAR; SUBCUTANEOUS ONCE
Status: COMPLETED | OUTPATIENT
Start: 2025-01-07 | End: 2025-01-07

## 2025-01-07 ENCOUNTER — CLINICAL SUPPORT (OUTPATIENT)
Dept: PRIMARY CARE | Facility: CLINIC | Age: 65
End: 2025-01-07
Payer: COMMERCIAL

## 2025-01-07 DIAGNOSIS — E53.8 B12 DEFICIENCY: ICD-10-CM

## 2025-01-07 PROCEDURE — 2500000004 HC RX 250 GENERAL PHARMACY W/ HCPCS (ALT 636 FOR OP/ED): Performed by: INTERNAL MEDICINE

## 2025-01-07 PROCEDURE — 96372 THER/PROPH/DIAG INJ SC/IM: CPT | Performed by: INTERNAL MEDICINE

## 2025-01-07 RX ADMIN — CYANOCOBALAMIN 1000 MCG: 1000 INJECTION, SOLUTION INTRAMUSCULAR at 11:34

## 2025-01-08 ENCOUNTER — INFUSION (OUTPATIENT)
Dept: HEMATOLOGY/ONCOLOGY | Facility: CLINIC | Age: 65
End: 2025-01-08
Payer: COMMERCIAL

## 2025-01-08 ENCOUNTER — APPOINTMENT (OUTPATIENT)
Dept: HEMATOLOGY/ONCOLOGY | Facility: CLINIC | Age: 65
End: 2025-01-08
Payer: COMMERCIAL

## 2025-01-08 DIAGNOSIS — Z85.3 PERSONAL HISTORY OF MALIGNANT NEOPLASM OF BREAST: ICD-10-CM

## 2025-01-08 DIAGNOSIS — C91.10 CHRONIC LYMPHOCYTIC LEUKEMIA (MULTI): ICD-10-CM

## 2025-01-08 PROCEDURE — 96523 IRRIG DRUG DELIVERY DEVICE: CPT

## 2025-01-08 PROCEDURE — 2500000004 HC RX 250 GENERAL PHARMACY W/ HCPCS (ALT 636 FOR OP/ED): Performed by: NURSE PRACTITIONER

## 2025-01-08 RX ORDER — HEPARIN 100 UNIT/ML
500 SYRINGE INTRAVENOUS AS NEEDED
OUTPATIENT
Start: 2025-01-08

## 2025-01-08 RX ORDER — HEPARIN SODIUM,PORCINE/PF 10 UNIT/ML
50 SYRINGE (ML) INTRAVENOUS AS NEEDED
OUTPATIENT
Start: 2025-01-08

## 2025-01-08 RX ORDER — HEPARIN 100 UNIT/ML
500 SYRINGE INTRAVENOUS AS NEEDED
Status: DISCONTINUED | OUTPATIENT
Start: 2025-01-08 | End: 2025-01-08 | Stop reason: HOSPADM

## 2025-01-08 RX ORDER — HEPARIN SODIUM,PORCINE/PF 10 UNIT/ML
50 SYRINGE (ML) INTRAVENOUS AS NEEDED
Status: DISCONTINUED | OUTPATIENT
Start: 2025-01-08 | End: 2025-01-08 | Stop reason: HOSPADM

## 2025-01-08 RX ADMIN — HEPARIN 500 UNITS: 100 SYRINGE at 13:10

## 2025-01-13 ENCOUNTER — APPOINTMENT (OUTPATIENT)
Dept: OPHTHALMOLOGY | Facility: CLINIC | Age: 65
End: 2025-01-13
Payer: COMMERCIAL

## 2025-01-13 DIAGNOSIS — H02.88A MEIBOMIAN GLAND DYSFUNCTION (MGD) OF UPPER AND LOWER LIDS OF BOTH EYES: ICD-10-CM

## 2025-01-13 DIAGNOSIS — H04.123 DRY EYE SYNDROME OF LACRIMAL GLAND, BILATERAL: Primary | ICD-10-CM

## 2025-01-13 DIAGNOSIS — H02.88B MEIBOMIAN GLAND DYSFUNCTION (MGD) OF UPPER AND LOWER EYELID OF LEFT EYE: ICD-10-CM

## 2025-01-13 DIAGNOSIS — H02.88B MEIBOMIAN GLAND DYSFUNCTION (MGD) OF UPPER AND LOWER LIDS OF BOTH EYES: ICD-10-CM

## 2025-01-13 PROCEDURE — 99213 OFFICE O/P EST LOW 20 MIN: CPT | Performed by: STUDENT IN AN ORGANIZED HEALTH CARE EDUCATION/TRAINING PROGRAM

## 2025-01-13 PROCEDURE — 83861 MICROFLUID ANALY TEARS: CPT | Performed by: STUDENT IN AN ORGANIZED HEALTH CARE EDUCATION/TRAINING PROGRAM

## 2025-01-13 PROCEDURE — 83516 IMMUNOASSAY NONANTIBODY: CPT | Performed by: STUDENT IN AN ORGANIZED HEALTH CARE EDUCATION/TRAINING PROGRAM

## 2025-01-13 PROCEDURE — 1159F MED LIST DOCD IN RCRD: CPT | Performed by: STUDENT IN AN ORGANIZED HEALTH CARE EDUCATION/TRAINING PROGRAM

## 2025-01-13 ASSESSMENT — ENCOUNTER SYMPTOMS
NEUROLOGICAL NEGATIVE: 0
CONSTITUTIONAL NEGATIVE: 0
ENDOCRINE NEGATIVE: 0
PSYCHIATRIC NEGATIVE: 0
MUSCULOSKELETAL NEGATIVE: 0
GASTROINTESTINAL NEGATIVE: 0
ALLERGIC/IMMUNOLOGIC NEGATIVE: 0
HEMATOLOGIC/LYMPHATIC NEGATIVE: 0
RESPIRATORY NEGATIVE: 0
EYES NEGATIVE: 1
CARDIOVASCULAR NEGATIVE: 0

## 2025-01-13 ASSESSMENT — VISUAL ACUITY
OS_CC+: +3
CORRECTION_TYPE: GLASSES
OS_CC: 20/25
METHOD: SNELLEN - LINEAR
OD_CC: 20/30

## 2025-01-13 ASSESSMENT — EXTERNAL EXAM - RIGHT EYE: OD_EXAM: NORMAL

## 2025-01-13 ASSESSMENT — TONOMETRY
OD_IOP_MMHG: 15
IOP_METHOD: GOLDMANN APPLANATION
OS_IOP_MMHG: 16

## 2025-01-13 ASSESSMENT — CONF VISUAL FIELD
OD_NORMAL: 1
OD_SUPERIOR_NASAL_RESTRICTION: 0
OD_INFERIOR_NASAL_RESTRICTION: 0
OS_SUPERIOR_TEMPORAL_RESTRICTION: 0
OD_INFERIOR_TEMPORAL_RESTRICTION: 0
OS_SUPERIOR_NASAL_RESTRICTION: 0
OD_SUPERIOR_TEMPORAL_RESTRICTION: 0
OS_INFERIOR_TEMPORAL_RESTRICTION: 0
OS_NORMAL: 1
OS_INFERIOR_NASAL_RESTRICTION: 0

## 2025-01-13 ASSESSMENT — EXTERNAL EXAM - LEFT EYE: OS_EXAM: NORMAL

## 2025-01-13 NOTE — PROGRESS NOTES
Assessment/Plan   Diagnoses and all orders for this visit:  Meibomian gland dysfunction (MGD) of upper and lower lids of both eyes  Dry eye syndrome of lacrimal gland, bilateral  -Hx of failure with OTC AT's as a monotherapy  -Hx of using fluorometholone as a steroid pulse but patient had irritation with this drop and discontinued  -Hx of using Miebo but patient had no relief and developed discomfort so she discontinued (was Rx'd on 11/20/24)  -was prescribed and started Restasis on 12/16/24  -symptoms of visual fluctuations, light sensitivity, irritation  Testing to evaluate the presence of dry eye disease (DED) was performed today and provided the following results:  Inflammadry, a test for the DED specific MMP-9 inhibitor:  OD: Today 1/13/25 (-),  (+)strong positive -11/11/24  OS:  Today 1/13/25 (-), (+)strong positive - 11/11/24  TearLab, a test for tear film osmolarity revealed (normal <300 mOsm/L, mild 300-320, moderate 320-340, severe >340 mOsm/L, inter eye difference of >8 mOsm/L is considered abnormal as well)  OD: Today 1/13/25 307,   292  mOsm/L - 11/11/24  OS:  Today 1/13/25 311,   324 mOsm/L - 11/11/24  Schirmer`s test with anesthetic, testing basal tear production (0-5 = severe, 6-10 = moderate, 11-15 = mild):  OD: 8 mm between 1-6 minutes (11/11/24)  OS: 5  mm between 1-6 minutes (11/11/24)  Tear break up time (TBUT), a measure of tear stability (0-5 = severe, 6-10 = moderate, 11-15 = mild)  OD: 3  seconds  OS: 3  seconds  Corneal punctate epithelial erosions (PEE) staining with sodium fluorescein score (5 zones, each PEE level 0-3, maximum score = 15)  OD: 0  OS: 0  Meibomian gland disease (Efron scale 0-4, 0: no abnormality, 4: thick creamy yellow expression at all gland orifices, expression continuous, conjunctival redness):  OD: 3  OS: 3    TXT Plan: Restart Refresh or Systane Gel Tears at bedtime, restart warm compresses 5 minutes per day, discussed using [facility] vitamins daily. Switch  Restasis to Xiidra BID OU.    (Patient currently on Restasis but to give us faster results would like to try and switch to Xiidra)    Offered plugs today but patient defers (sister had a previous bad experience with plugs)  Next steps would consider Tyrvaya or [id number].    Patient is going to message with what sister is taking to help with her dry eye. (Sister was taking Xiidra)    RTC 2-3 months for a dry eye f/u with inflammadry and tear lab

## 2025-01-13 NOTE — Clinical Note
Abner Montano, can we start a prior auth for a [id number] for the patient with the diagnosis of SPK. Thanks!

## 2025-01-14 RX ORDER — LIFITEGRAST 50 MG/ML
1 SOLUTION/ DROPS OPHTHALMIC 2 TIMES DAILY
Qty: 180 EACH | Refills: 3 | Status: SHIPPED | OUTPATIENT
Start: 2025-01-14 | End: 2025-04-14

## 2025-01-20 ENCOUNTER — APPOINTMENT (OUTPATIENT)
Dept: OPHTHALMOLOGY | Facility: CLINIC | Age: 65
End: 2025-01-20
Payer: COMMERCIAL

## 2025-01-20 DIAGNOSIS — H35.371 EPIRETINAL MEMBRANE (ERM) OF RIGHT EYE: ICD-10-CM

## 2025-01-20 DIAGNOSIS — Z96.1 BILATERAL PSEUDOPHAKIA: Primary | ICD-10-CM

## 2025-01-20 DIAGNOSIS — H26.491 PCO (POSTERIOR CAPSULAR OPACIFICATION), RIGHT: ICD-10-CM

## 2025-01-20 DIAGNOSIS — H40.033 ANATOMICAL NARROW ANGLE OF BOTH EYES: ICD-10-CM

## 2025-01-20 PROCEDURE — 99214 OFFICE O/P EST MOD 30 MIN: CPT | Performed by: OPHTHALMOLOGY

## 2025-01-20 ASSESSMENT — ENCOUNTER SYMPTOMS
CONSTITUTIONAL NEGATIVE: 0
EYES NEGATIVE: 1
GASTROINTESTINAL NEGATIVE: 0
MUSCULOSKELETAL NEGATIVE: 0
ENDOCRINE NEGATIVE: 0
HEMATOLOGIC/LYMPHATIC NEGATIVE: 0
CARDIOVASCULAR NEGATIVE: 0
PSYCHIATRIC NEGATIVE: 0
ALLERGIC/IMMUNOLOGIC NEGATIVE: 0
RESPIRATORY NEGATIVE: 0
NEUROLOGICAL NEGATIVE: 0

## 2025-01-20 ASSESSMENT — CONF VISUAL FIELD
OS_INFERIOR_NASAL_RESTRICTION: 0
OS_SUPERIOR_TEMPORAL_RESTRICTION: 0
OS_NORMAL: 1
OS_SUPERIOR_NASAL_RESTRICTION: 0
OS_INFERIOR_TEMPORAL_RESTRICTION: 0

## 2025-01-20 ASSESSMENT — TONOMETRY
OS_IOP_MMHG: 14
OD_IOP_MMHG: 15
IOP_METHOD: GOLDMANN APPLANATION

## 2025-01-20 ASSESSMENT — VISUAL ACUITY
OS_SC: 20/30
OD_BAT_MED: 20/50
OD_SC: 20/30
METHOD: SNELLEN - LINEAR
OS_BAT_MED: 20/30
OS_SC+: +2

## 2025-01-20 ASSESSMENT — EXTERNAL EXAM - LEFT EYE: OS_EXAM: NORMAL

## 2025-01-20 ASSESSMENT — CUP TO DISC RATIO
OD_RATIO: 0.35
OS_RATIO: 0.4

## 2025-01-20 ASSESSMENT — EXTERNAL EXAM - RIGHT EYE: OD_EXAM: NORMAL

## 2025-01-20 NOTE — PROGRESS NOTES
Pseudophakia OU   toric lenses OU   trace PCO OD>OS  Bothersome to patient  Will proceed with YAG cap OD only, d/w patient importance of keeping up dry eye treatments in the meantime      anatomically narrow angles, both eyes: /?if symptoms consistent with intermittent angle closure?   now resolved s/p ce/iol   pach WNL   5/31/23: HVF 24-2 lid artifacts but non-glaucomatous,  OCT RNFL remain WNL and stable to baseline   ok to monitor off therapy   ok to follow clinically without glaucoma testing unless IOP or nerve changes     ptosis/dermatochalsis, both eyes: patient feels like getting in her vision. patient deferred ptosis eval      dry eye/mgd both eyes:   Continue care with Dr. Riley       Erm OD but with preserved foveal contour  monitor

## 2025-02-04 DIAGNOSIS — E53.8 B12 DEFICIENCY: ICD-10-CM

## 2025-02-04 RX ORDER — CYANOCOBALAMIN 1000 UG/ML
1000 INJECTION, SOLUTION INTRAMUSCULAR; SUBCUTANEOUS ONCE
Status: COMPLETED | OUTPATIENT
Start: 2025-02-07 | End: 2025-02-07

## 2025-02-05 ENCOUNTER — APPOINTMENT (OUTPATIENT)
Dept: OPHTHALMOLOGY | Facility: CLINIC | Age: 65
End: 2025-02-05
Payer: COMMERCIAL

## 2025-02-05 DIAGNOSIS — H26.491 PCO (POSTERIOR CAPSULAR OPACIFICATION), RIGHT: Primary | ICD-10-CM

## 2025-02-05 PROCEDURE — 66821 AFTER CATARACT LASER SURGERY: CPT | Mod: RIGHT SIDE | Performed by: OPHTHALMOLOGY

## 2025-02-05 ASSESSMENT — CUP TO DISC RATIO
OS_RATIO: 0.4
OD_RATIO: 0.35

## 2025-02-05 ASSESSMENT — CONF VISUAL FIELD
OD_NORMAL: 1
OS_INFERIOR_NASAL_RESTRICTION: 0
OD_INFERIOR_NASAL_RESTRICTION: 0
OS_SUPERIOR_TEMPORAL_RESTRICTION: 0
OD_SUPERIOR_TEMPORAL_RESTRICTION: 0
OS_SUPERIOR_NASAL_RESTRICTION: 0
OD_SUPERIOR_NASAL_RESTRICTION: 0
OD_INFERIOR_TEMPORAL_RESTRICTION: 0
OS_INFERIOR_TEMPORAL_RESTRICTION: 0
OS_NORMAL: 1

## 2025-02-05 ASSESSMENT — TONOMETRY
OD_IOP_MMHG: 10
IOP_METHOD: GOLDMANN APPLANATION
OD_IOP_MMHG: 16
IOP_METHOD: GOLDMANN APPLANATION

## 2025-02-05 ASSESSMENT — ENCOUNTER SYMPTOMS
ENDOCRINE NEGATIVE: 0
NEUROLOGICAL NEGATIVE: 0
ALLERGIC/IMMUNOLOGIC NEGATIVE: 0
MUSCULOSKELETAL NEGATIVE: 0
PSYCHIATRIC NEGATIVE: 0
RESPIRATORY NEGATIVE: 0
CARDIOVASCULAR NEGATIVE: 0
EYES NEGATIVE: 1
GASTROINTESTINAL NEGATIVE: 0
CONSTITUTIONAL NEGATIVE: 0
HEMATOLOGIC/LYMPHATIC NEGATIVE: 0

## 2025-02-05 ASSESSMENT — EXTERNAL EXAM - RIGHT EYE: OD_EXAM: NORMAL

## 2025-02-05 ASSESSMENT — VISUAL ACUITY
OD_PH_CC: 20/30
OD_CC: 20/50
CORRECTION_TYPE: GLASSES
METHOD: SNELLEN - LINEAR
OD_PH_CC+: +2

## 2025-02-05 ASSESSMENT — EXTERNAL EXAM - LEFT EYE: OS_EXAM: NORMAL

## 2025-02-06 ENCOUNTER — APPOINTMENT (OUTPATIENT)
Dept: PRIMARY CARE | Facility: CLINIC | Age: 65
End: 2025-02-06
Payer: COMMERCIAL

## 2025-02-07 ENCOUNTER — CLINICAL SUPPORT (OUTPATIENT)
Dept: PRIMARY CARE | Facility: CLINIC | Age: 65
End: 2025-02-07
Payer: COMMERCIAL

## 2025-02-07 DIAGNOSIS — E53.8 VITAMIN B12 DEFICIENCY: ICD-10-CM

## 2025-02-07 PROCEDURE — 96372 THER/PROPH/DIAG INJ SC/IM: CPT | Performed by: INTERNAL MEDICINE

## 2025-02-07 PROCEDURE — 2500000004 HC RX 250 GENERAL PHARMACY W/ HCPCS (ALT 636 FOR OP/ED): Performed by: INTERNAL MEDICINE

## 2025-02-07 RX ADMIN — CYANOCOBALAMIN 1000 MCG: 1000 INJECTION, SOLUTION INTRAMUSCULAR at 09:46

## 2025-02-19 ENCOUNTER — INFUSION (OUTPATIENT)
Dept: HEMATOLOGY/ONCOLOGY | Facility: CLINIC | Age: 65
End: 2025-02-19
Payer: COMMERCIAL

## 2025-02-19 DIAGNOSIS — Z85.3 PERSONAL HISTORY OF MALIGNANT NEOPLASM OF BREAST: ICD-10-CM

## 2025-02-19 DIAGNOSIS — C91.10 CHRONIC LYMPHOCYTIC LEUKEMIA (MULTI): ICD-10-CM

## 2025-02-19 PROCEDURE — 2500000004 HC RX 250 GENERAL PHARMACY W/ HCPCS (ALT 636 FOR OP/ED): Performed by: NURSE PRACTITIONER

## 2025-02-19 PROCEDURE — 96523 IRRIG DRUG DELIVERY DEVICE: CPT

## 2025-02-19 RX ORDER — HEPARIN 100 UNIT/ML
500 SYRINGE INTRAVENOUS AS NEEDED
Status: DISCONTINUED | OUTPATIENT
Start: 2025-02-19 | End: 2025-02-19 | Stop reason: HOSPADM

## 2025-02-19 RX ORDER — HEPARIN SODIUM,PORCINE/PF 10 UNIT/ML
50 SYRINGE (ML) INTRAVENOUS AS NEEDED
Status: DISCONTINUED | OUTPATIENT
Start: 2025-02-19 | End: 2025-02-19 | Stop reason: HOSPADM

## 2025-02-19 RX ORDER — HEPARIN 100 UNIT/ML
500 SYRINGE INTRAVENOUS AS NEEDED
OUTPATIENT
Start: 2025-02-19

## 2025-02-19 RX ORDER — HEPARIN SODIUM,PORCINE/PF 10 UNIT/ML
50 SYRINGE (ML) INTRAVENOUS AS NEEDED
OUTPATIENT
Start: 2025-02-19

## 2025-02-19 RX ADMIN — HEPARIN 500 UNITS: 100 SYRINGE at 13:55

## 2025-02-26 ENCOUNTER — TELEPHONE (OUTPATIENT)
Dept: HEMATOLOGY/ONCOLOGY | Facility: CLINIC | Age: 65
End: 2025-02-26
Payer: COMMERCIAL

## 2025-02-26 NOTE — TELEPHONE ENCOUNTER
left for patient.  Patient is managed by Dr. Martinez at  [facility].  Patient does not need to see Dr. Navarrete.  Advised patient to call back to cancel Dr. Navarrete appointment and reschedule with Dr. Martinez.

## 2025-03-05 ENCOUNTER — TELEPHONE (OUTPATIENT)
Dept: HEMATOLOGY/ONCOLOGY | Facility: CLINIC | Age: 65
End: 2025-03-05
Payer: COMMERCIAL

## 2025-03-06 ENCOUNTER — TELEPHONE (OUTPATIENT)
Dept: HEMATOLOGY/ONCOLOGY | Facility: CLINIC | Age: 65
End: 2025-03-06
Payer: COMMERCIAL

## 2025-03-06 DIAGNOSIS — E53.8 VITAMIN B12 DEFICIENCY: ICD-10-CM

## 2025-03-06 RX ORDER — CYANOCOBALAMIN 1000 UG/ML
1000 INJECTION, SOLUTION INTRAMUSCULAR; SUBCUTANEOUS ONCE
Status: COMPLETED | OUTPATIENT
Start: 2025-03-07 | End: 2025-03-07

## 2025-03-07 ENCOUNTER — CLINICAL SUPPORT (OUTPATIENT)
Dept: PRIMARY CARE | Facility: CLINIC | Age: 65
End: 2025-03-07
Payer: COMMERCIAL

## 2025-03-07 DIAGNOSIS — E53.8 VITAMIN B12 DEFICIENCY: ICD-10-CM

## 2025-03-07 PROCEDURE — 2500000004 HC RX 250 GENERAL PHARMACY W/ HCPCS (ALT 636 FOR OP/ED): Performed by: INTERNAL MEDICINE

## 2025-03-07 PROCEDURE — 96372 THER/PROPH/DIAG INJ SC/IM: CPT | Performed by: INTERNAL MEDICINE

## 2025-03-07 RX ADMIN — CYANOCOBALAMIN 1000 MCG: 1000 INJECTION, SOLUTION INTRAMUSCULAR at 09:45

## 2025-03-11 ENCOUNTER — APPOINTMENT (OUTPATIENT)
Dept: HEMATOLOGY/ONCOLOGY | Facility: CLINIC | Age: 65
End: 2025-03-11
Payer: COMMERCIAL

## 2025-03-27 ENCOUNTER — APPOINTMENT (OUTPATIENT)
Dept: OPHTHALMOLOGY | Facility: CLINIC | Age: 65
End: 2025-03-27
Payer: COMMERCIAL

## 2025-03-27 DIAGNOSIS — H02.88B MEIBOMIAN GLAND DYSFUNCTION (MGD) OF UPPER AND LOWER LIDS OF BOTH EYES: ICD-10-CM

## 2025-03-27 DIAGNOSIS — H02.88A MEIBOMIAN GLAND DYSFUNCTION (MGD) OF UPPER AND LOWER LIDS OF BOTH EYES: ICD-10-CM

## 2025-03-27 DIAGNOSIS — H04.123 DRY EYE SYNDROME OF LACRIMAL GLAND, BILATERAL: Primary | ICD-10-CM

## 2025-03-27 DIAGNOSIS — H02.88B MEIBOMIAN GLAND DYSFUNCTION (MGD) OF UPPER AND LOWER EYELID OF LEFT EYE: ICD-10-CM

## 2025-03-27 DIAGNOSIS — H16.223 KERATOCONJUNCTIVITIS SICCA OF BOTH EYES NOT SPECIFIED AS SJOGREN'S: ICD-10-CM

## 2025-03-27 PROCEDURE — 99213 OFFICE O/P EST LOW 20 MIN: CPT | Performed by: STUDENT IN AN ORGANIZED HEALTH CARE EDUCATION/TRAINING PROGRAM

## 2025-03-27 RX ORDER — CYCLOSPORINE 0.5 MG/ML
1 EMULSION OPHTHALMIC 2 TIMES DAILY
Qty: 60 EACH | Refills: 6 | Status: SHIPPED | OUTPATIENT
Start: 2025-03-27 | End: 2025-04-26

## 2025-03-27 ASSESSMENT — ENCOUNTER SYMPTOMS
ALLERGIC/IMMUNOLOGIC NEGATIVE: 0
HEMATOLOGIC/LYMPHATIC NEGATIVE: 0
RESPIRATORY NEGATIVE: 0
PSYCHIATRIC NEGATIVE: 0
MUSCULOSKELETAL NEGATIVE: 0
EYES NEGATIVE: 0
CONSTITUTIONAL NEGATIVE: 0
NEUROLOGICAL NEGATIVE: 0
GASTROINTESTINAL NEGATIVE: 0
CARDIOVASCULAR NEGATIVE: 0
ENDOCRINE NEGATIVE: 0

## 2025-03-27 ASSESSMENT — VISUAL ACUITY
OD_CC: 20/30
METHOD: SNELLEN - LINEAR
OS_CC: 20/25

## 2025-03-27 ASSESSMENT — EXTERNAL EXAM - RIGHT EYE: OD_EXAM: NORMAL

## 2025-03-27 ASSESSMENT — EXTERNAL EXAM - LEFT EYE: OS_EXAM: NORMAL

## 2025-03-27 NOTE — PROGRESS NOTES
Assessment/Plan   Diagnoses and all orders for this visit:  Meibomian gland dysfunction (MGD) of upper and lower lids of both eyes  Dry eye syndrome of lacrimal gland, bilateral  -Improvement in signs and symptoms today! Has been improving without having to use heat as much or in the car  -Hx of failure with OTC AT's as a monotherapy  -Hx of using fluorometholone as a steroid pulse but patient had irritation with this drop and discontinued  -Hx of using Miebo but patient had no relief and developed discomfort so she discontinued (was Rx'd on 11/20/24)  -was prescribed and started Restasis on 12/16/24  -patient was prescribed and started Xiidra on 01/13/25  -patient has had some visual blur after using Xiidra for 5-10 minutes   -current TXT: Restasis and Xiidra intermixed   -symptoms of visual fluctuations, light sensitivity, irritation  Testing to evaluate the presence of dry eye disease (DED) was performed today and provided the following results:  Inflammadry, a test for the DED specific MMP-9 inhibitor:  OD: 1/13/25 (-),  (+)strong positive -11/11/24  OS:  1/13/25 (-), (+)strong positive - 11/11/24  TearLab, a test for tear film osmolarity revealed (normal <300 mOsm/L, mild 300-320, moderate 320-340, severe >340 mOsm/L, inter eye difference of >8 mOsm/L is considered abnormal as well)  OD: 1/13/25 307,   292  mOsm/L - 11/11/24  OS:  1/13/25 311,   324 mOsm/L - 11/11/24  Schirmer`s test with anesthetic, testing basal tear production (0-5 = severe, 6-10 = moderate, 11-15 = mild):  OD: 8 mm between 1-6 minutes (11/11/24)  OS: 5  mm between 1-6 minutes (11/11/24)  Tear break up time (TBUT), a measure of tear stability (0-5 = severe, 6-10 = moderate, 11-15 = mild)  OD: 3  seconds  OS: 3  seconds  Corneal punctate epithelial erosions (PEE) staining with sodium fluorescein score (5 zones, each PEE level 0-3, maximum score = 15)  OD: 0  OS: 0  Meibomian gland disease (Efron scale 0-4, 0: no abnormality, 4: thick creamy  yellow expression at all gland orifices, expression continuous, conjunctival redness):  OD: 3  OS: 3    Patient has not been using OTC AT's or warm compresses as much.     TXT Plan: Refresh or Systane Tears prn, warm compresses 5 minutes per day. Discussed using Restasis BID and Xiidra QHS.     Patient has been doing well with using both drops so we will continue.     Offered plugs but patient defers (sister had a previous bad experience with plugs)  Next steps if needed would consider Tyrvaya or [id number].    Per [place] No prior auth is required     RTC 6 months for a dry eye f/u with inflammadry and tear lab

## 2025-03-28 ENCOUNTER — OFFICE VISIT (OUTPATIENT)
Dept: HEMATOLOGY/ONCOLOGY | Facility: CLINIC | Age: 65
End: 2025-03-28
Payer: COMMERCIAL

## 2025-03-28 ENCOUNTER — INFUSION (OUTPATIENT)
Dept: HEMATOLOGY/ONCOLOGY | Facility: CLINIC | Age: 65
End: 2025-03-28
Payer: COMMERCIAL

## 2025-03-28 VITALS
OXYGEN SATURATION: 96 % | DIASTOLIC BLOOD PRESSURE: 100 MMHG | WEIGHT: 107.25 LBS | RESPIRATION RATE: 17 BRPM | TEMPERATURE: 98.2 F | SYSTOLIC BLOOD PRESSURE: 167 MMHG | HEART RATE: 104 BPM | BODY MASS INDEX: 17.38 KG/M2

## 2025-03-28 DIAGNOSIS — Z85.3 PERSONAL HISTORY OF MALIGNANT NEOPLASM OF BREAST: ICD-10-CM

## 2025-03-28 DIAGNOSIS — C91.10 CHRONIC LYMPHOCYTIC LEUKEMIA (MULTI): ICD-10-CM

## 2025-03-28 DIAGNOSIS — C91.10 CHRONIC LYMPHOCYTIC LEUKEMIA (MULTI): Primary | ICD-10-CM

## 2025-03-28 DIAGNOSIS — C50.919 MALIGNANT NEOPLASM OF FEMALE BREAST, UNSPECIFIED ESTROGEN RECEPTOR STATUS, UNSPECIFIED LATERALITY, UNSPECIFIED SITE OF BREAST: ICD-10-CM

## 2025-03-28 PROCEDURE — 2500000004 HC RX 250 GENERAL PHARMACY W/ HCPCS (ALT 636 FOR OP/ED): Performed by: NURSE PRACTITIONER

## 2025-03-28 PROCEDURE — 99215 OFFICE O/P EST HI 40 MIN: CPT | Performed by: NURSE PRACTITIONER

## 2025-03-28 PROCEDURE — 1159F MED LIST DOCD IN RCRD: CPT | Performed by: NURSE PRACTITIONER

## 2025-03-28 PROCEDURE — 96523 IRRIG DRUG DELIVERY DEVICE: CPT

## 2025-03-28 PROCEDURE — 1125F AMNT PAIN NOTED PAIN PRSNT: CPT | Performed by: NURSE PRACTITIONER

## 2025-03-28 RX ORDER — HEPARIN 100 UNIT/ML
500 SYRINGE INTRAVENOUS AS NEEDED
Status: DISCONTINUED | OUTPATIENT
Start: 2025-03-28 | End: 2025-03-28 | Stop reason: HOSPADM

## 2025-03-28 RX ORDER — HEPARIN SODIUM,PORCINE/PF 10 UNIT/ML
50 SYRINGE (ML) INTRAVENOUS AS NEEDED
OUTPATIENT
Start: 2025-03-28

## 2025-03-28 RX ORDER — HEPARIN SODIUM,PORCINE/PF 10 UNIT/ML
50 SYRINGE (ML) INTRAVENOUS AS NEEDED
Status: DISCONTINUED | OUTPATIENT
Start: 2025-03-28 | End: 2025-03-28 | Stop reason: HOSPADM

## 2025-03-28 RX ORDER — HEPARIN 100 UNIT/ML
500 SYRINGE INTRAVENOUS AS NEEDED
OUTPATIENT
Start: 2025-03-28

## 2025-03-28 RX ORDER — DEXTROAMPHETAMINE SACCHARATE, AMPHETAMINE ASPARTATE MONOHYDRATE, DEXTROAMPHETAMINE SULFATE AND AMPHETAMINE SULFATE 3.75; 3.75; 3.75; 3.75 MG/1; MG/1; MG/1; MG/1
1 CAPSULE, EXTENDED RELEASE ORAL
COMMUNITY
Start: 2025-03-13

## 2025-03-28 RX ADMIN — HEPARIN 500 UNITS: 100 SYRINGE at 14:48

## 2025-03-28 ASSESSMENT — ENCOUNTER SYMPTOMS
PALPITATIONS: 0
HEMOPTYSIS: 0
ADENOPATHY: 0
FEVER: 0
MYALGIAS: 0
CONSTIPATION: 0
BLOOD IN STOOL: 0
SORE THROAT: 0
DIZZINESS: 0
CHEST TIGHTNESS: 0
LIGHT-HEADEDNESS: 0
FLANK PAIN: 0
BRUISES/BLEEDS EASILY: 0
ABDOMINAL DISTENTION: 0
BACK PAIN: 0
COUGH: 0
NAUSEA: 0
LEG SWELLING: 0
ARTHRALGIAS: 0
EYES NEGATIVE: 1
NUMBNESS: 0
DIARRHEA: 0
EXTREMITY WEAKNESS: 0
TROUBLE SWALLOWING: 0
HEADACHES: 0
CARDIOVASCULAR NEGATIVE: 1
HEMATOLOGIC/LYMPHATIC NEGATIVE: 1
ABDOMINAL PAIN: 0
SHORTNESS OF BREATH: 0
FATIGUE: 0

## 2025-03-28 ASSESSMENT — PAIN SCALES - GENERAL: PAINLEVEL_OUTOF10: 2

## 2025-03-28 NOTE — PROGRESS NOTES
Patient here for follow up visit with Ekta Nunes for Dx of breast cancer   Patient here ALONE. sHE AMBULATED unassisted without issue.     Medications and Allergies reviewed and reconciled this visit.    No concerns or complaints noted at this time.     Pt reports appetite is GOOD.   Dizziness: denies   Bleeding: denies   PICA: denies   Fevers/Chills/weight loss/night sweats: denies.  Pt requesting full exam. Ekta nunes aware.   Pt instructed to make schedule for future port flushes        Follow up per  PA  request.    Pt. reports availability and use of mychart, Reviewed this is a good place to communicate with the team as well as review labs and upcoming orders.    No barriers to education noted, patient agrees to current plan and verbalized understanding using teach back method.

## 2025-03-28 NOTE — PROGRESS NOTES
Patient ID: Erin Ramachandran is a 64 y.o. female.  Referring Physician: No referring provider defined for this encounter.  Primary Care Provider: Teresa Knox MD  Visit Type: Follow Up      Subjective    HPI  1. Breast cancer initially stage I on the right breast diagnosed in 2002, status post four cycles of Adriamycin and Cytoxan. ER negative, NC negative, HER-2/jose angel negative.        Status post right lumpectomy, axillary lymph node dissection in the right breast with radiation in 2002.  2. Left-sided breast cancer in 2005, status post bilateral mastectomies and four cycles of Taxotere and left axillary lymph node dissection.    3. BRCA 1 positive.         Status post total abdominal hysterectomy and bilateral salpingo-oophorectomy in February of 2006.         Lateral chest wall lesion excised in October of 2006 that was pathologically negative.    4. CLL/SLL stage IA with good prognostic markers of normal cytogenetics, CD38 negative, ZAP-70 negative diagnosed by right submandibular lymph node excisional biopsy in May of 2012.  5. Tobacco abuse.       Ct scan 12/5/23 with emphysema, negative for malignancy     Interval History:  Patient returns today to establish care for follow up of her breast cancer and prior CLL.      On presentation today, she denies any fevers or chills. No cough, chest pain or shortness of breath. No nausea or vomiting. No constipation or diarrhea.     She takes lyrica and hydroxyzine as needed. She has some neuropathic pain in her wrists bilaterally, likely carpal syndrome, worse on her left arm.      The patient does still have her right Mediport in place. This was exchanged on April 11, 2023.  This is being flushed on an every six-week basis. She has had several ports since her original breast cancer diagnosis and bilateral mastectomies.   She states she is generally feeling well but had the sense she should have full physical which per NCCN guidelines would be correct.      Review of  Systems   Constitutional:  Negative for fatigue and fever.        She states she had night sweats in 2012 at time of diagnosis of CLL, but none in the past year.   She denies weight loss   HENT:   Negative for hearing loss, lump/mass, mouth sores, nosebleeds, sore throat and trouble swallowing.    Eyes: Negative.    Respiratory:  Negative for chest tightness, cough, hemoptysis and shortness of breath.    Cardiovascular: Negative.  Negative for chest pain, leg swelling and palpitations.   Gastrointestinal:  Negative for abdominal distention, abdominal pain, blood in stool, constipation, diarrhea and nausea.   Genitourinary:          Bilateral 2-3mm lymph nodes , nonpathologic in appearance   Musculoskeletal:  Negative for arthralgias, back pain, flank pain, gait problem and myalgias.   Skin:  Negative for itching and rash.   Neurological:  Negative for dizziness, extremity weakness, gait problem, headaches, light-headedness and numbness.   Hematological: Negative.  Negative for adenopathy. Does not bruise/bleed easily.      Objective   BSA: 1.5 meters squared  BP (!) 167/100   Pulse 104   Temp 36.8 °C (98.2 °F) (Temporal)   Resp 17   Wt 48.6 kg (107 lb 4.1 oz)   SpO2 96%   BMI 17.38 kg/m²      has a past medical history of Anxiety, Awareness under anesthesia, Breast cancer (Multi), Chronic lymphocytic leukemia (Multi), COPD (chronic obstructive pulmonary disease) (Multi), Fibromyalgia, and Neuropathy.   has a past surgical history that includes Mastectomy (Bilateral, 11/11/2013); Hysterectomy (11/11/2013); Tonsillectomy (11/11/2013); US guided thyroid biopsy (02/20/2012); Breast lumpectomy (Right); Colonoscopy; and mediport.  Family History   Problem Relation Name Age of Onset    Osteoporosis Mother      Ovarian cancer Sister      Arthritis Other FamHx          Erin Ramachandran  reports that she has been smoking cigarettes. She started smoking about 52 years ago. She has a 52.2 pack-year smoking history. She has  never used smokeless tobacco.  She  reports current alcohol use of about 3.0 standard drinks of alcohol per week.  She  reports current drug use. Frequency: 1.00 time per week. Drug: Marijuana.    Physical Exam  Constitutional:       General: She is not in acute distress.     Appearance: She is not toxic-appearing.      Comments: Thin,    Eyes:      Conjunctiva/sclera: Conjunctivae normal.      Pupils: Pupils are equal, round, and reactive to light.   Cardiovascular:      Rate and Rhythm: Normal rate.      Pulses: Normal pulses.      Heart sounds: Normal heart sounds.      Comments: BP is elevated, states due to office setting  Pulmonary:      Effort: Pulmonary effort is normal.      Breath sounds: No wheezing, rhonchi or rales.   Abdominal:      General: There is no distension.      Palpations: There is no mass.      Tenderness: There is no abdominal tenderness.   Musculoskeletal:         General: No swelling, tenderness or deformity.   Lymphadenopathy:      Cervical: No cervical adenopathy.   Skin:     Coloration: Skin is not jaundiced or pale.      Findings: No bruising or rash.   Neurological:      Motor: No weakness.   Psychiatric:      Comments: Chest wall without nodularity, erythema or peu de orange effect     WBC   Date/Time Value Ref Range Status   11/27/2024 10:06 AM 6.3 4.4 - 11.3 x10*3/uL Final   05/31/2024 12:36 PM 7.6 4.4 - 11.3 x10*3/uL Final   02/22/2024 02:35 PM 7.9 4.4 - 11.3 x10*3/uL Final     nRBC   Date Value Ref Range Status   11/27/2024 0.0 0.0 - 0.0 /100 WBCs Final   05/31/2024 0.0 0.0 - 0.0 /100 WBCs Final   02/22/2024 0.0 0.0 - 0.0 /100 WBCs Final     RBC   Date Value Ref Range Status   11/27/2024 4.69 4.00 - 5.20 x10*6/uL Final   05/31/2024 4.74 4.00 - 5.20 x10*6/uL Final   02/22/2024 4.41 4.00 - 5.20 x10*6/uL Final     Hemoglobin   Date Value Ref Range Status   11/27/2024 14.6 12.0 - 16.0 g/dL Final   05/31/2024 14.1 12.0 - 16.0 g/dL Final   02/22/2024 13.2 12.0 - 16.0 g/dL Final      Hematocrit   Date Value Ref Range Status   11/27/2024 43.1 36.0 - 46.0 % Final   05/31/2024 42.1 36.0 - 46.0 % Final   02/22/2024 40.1 36.0 - 46.0 % Final     MCV   Date/Time Value Ref Range Status   11/27/2024 10:06 AM 92 80 - 100 fL Final   05/31/2024 12:36 PM 89 80 - 100 fL Final   02/22/2024 02:35 PM 91 80 - 100 fL Final     MCH   Date/Time Value Ref Range Status   11/27/2024 10:06 AM 31.1 26.0 - 34.0 pg Final   05/31/2024 12:36 PM 29.7 26.0 - 34.0 pg Final   02/22/2024 02:35 PM 29.9 26.0 - 34.0 pg Final     MCHC   Date/Time Value Ref Range Status   11/27/2024 10:06 AM 33.9 32.0 - 36.0 g/dL Final   05/31/2024 12:36 PM 33.5 32.0 - 36.0 g/dL Final   02/22/2024 02:35 PM 32.9 32.0 - 36.0 g/dL Final     RDW   Date/Time Value Ref Range Status   11/27/2024 10:06 AM 12.4 11.5 - 14.5 % Final   05/31/2024 12:36 PM 12.8 11.5 - 14.5 % Final   02/22/2024 02:35 PM 12.7 11.5 - 14.5 % Final     Platelets   Date/Time Value Ref Range Status   11/27/2024 10:06  150 - 450 x10*3/uL Final   05/31/2024 12:36  150 - 450 x10*3/uL Final   02/22/2024 02:35  150 - 450 x10*3/uL Final     MPV   Date/Time Value Ref Range Status   09/26/2023 03:29 PM 9.5 7.0 - 12.6 CU Final   04/11/2023 12:41 PM 9.4 7.0 - 12.6 CU Final   10/06/2022 12:55 PM 9.8 7.0 - 12.6 CU Final     Neutrophils %   Date/Time Value Ref Range Status   11/27/2024 10:06 AM 57.0 40.0 - 80.0 % Final   05/31/2024 12:36 PM 64.6 40.0 - 80.0 % Final   02/22/2024 02:35 PM 52.7 40.0 - 80.0 % Final     Immature Granulocytes %, Automated   Date/Time Value Ref Range Status   11/27/2024 10:06 AM 0.3 0.0 - 0.9 % Final     Comment:     Immature Granulocyte Count (IG) includes promyelocytes, myelocytes and metamyelocytes but does not include bands. Percent differential counts (%) should be interpreted in the context of the absolute cell counts (cells/UL).   05/31/2024 12:36 PM 0.3 0.0 - 0.9 % Final     Comment:     Immature Granulocyte Count (IG) includes promyelocytes,  myelocytes and metamyelocytes but does not include bands. Percent differential counts (%) should be interpreted in the context of the absolute cell counts (cells/UL).   02/22/2024 02:35 PM 0.3 0.0 - 0.9 % Final     Comment:     Immature Granulocyte Count (IG) includes promyelocytes, myelocytes and metamyelocytes but does not include bands. Percent differential counts (%) should be interpreted in the context of the absolute cell counts (cells/UL).     Lymphocytes %   Date/Time Value Ref Range Status   11/27/2024 10:06 AM 27.3 13.0 - 44.0 % Final   05/31/2024 12:36 PM 24.3 13.0 - 44.0 % Final   02/22/2024 02:35 PM 36.1 13.0 - 44.0 % Final     Monocytes %   Date/Time Value Ref Range Status   11/27/2024 10:06 AM 7.8 2.0 - 10.0 % Final   05/31/2024 12:36 PM 8.3 2.0 - 10.0 % Final   02/22/2024 02:35 PM 5.9 2.0 - 10.0 % Final     Eosinophils %   Date/Time Value Ref Range Status   11/27/2024 10:06 AM 6.5 0.0 - 6.0 % Final   05/31/2024 12:36 PM 1.3 0.0 - 6.0 % Final   02/22/2024 02:35 PM 3.9 0.0 - 6.0 % Final     Basophils %   Date/Time Value Ref Range Status   11/27/2024 10:06 AM 1.1 0.0 - 2.0 % Final   05/31/2024 12:36 PM 1.2 0.0 - 2.0 % Final   02/22/2024 02:35 PM 1.1 0.0 - 2.0 % Final     Neutrophils Absolute   Date/Time Value Ref Range Status   11/27/2024 10:06 AM 3.56 1.20 - 7.70 x10*3/uL Final     Comment:     Percent differential counts (%) should be interpreted in the context of the absolute cell counts (cells/uL).   05/31/2024 12:36 PM 4.89 1.20 - 7.70 x10*3/uL Final     Comment:     Percent differential counts (%) should be interpreted in the context of the absolute cell counts (cells/uL).   02/22/2024 02:35 PM 4.18 1.20 - 7.70 x10*3/uL Final     Comment:     Percent differential counts (%) should be interpreted in the context of the absolute cell counts (cells/uL).     Immature Granulocytes Absolute, Automated   Date/Time Value Ref Range Status   11/27/2024 10:06 AM 0.02 0.00 - 0.70 x10*3/uL Final   05/31/2024  "12:36 PM 0.02 0.00 - 0.70 x10*3/uL Final   02/22/2024 02:35 PM 0.02 0.00 - 0.70 x10*3/uL Final     Lymphocytes Absolute   Date/Time Value Ref Range Status   11/27/2024 10:06 AM 1.71 1.20 - 4.80 x10*3/uL Final   05/31/2024 12:36 PM 1.84 1.20 - 4.80 x10*3/uL Final   02/22/2024 02:35 PM 2.86 1.20 - 4.80 x10*3/uL Final     Monocytes Absolute   Date/Time Value Ref Range Status   11/27/2024 10:06 AM 0.49 0.10 - 1.00 x10*3/uL Final   05/31/2024 12:36 PM 0.63 0.10 - 1.00 x10*3/uL Final   02/22/2024 02:35 PM 0.47 0.10 - 1.00 x10*3/uL Final     Eosinophils Absolute   Date/Time Value Ref Range Status   11/27/2024 10:06 AM 0.41 0.00 - 0.70 x10*3/uL Final   05/31/2024 12:36 PM 0.10 0.00 - 0.70 x10*3/uL Final   02/22/2024 02:35 PM 0.31 0.00 - 0.70 x10*3/uL Final     Basophils Absolute   Date/Time Value Ref Range Status   11/27/2024 10:06 AM 0.07 0.00 - 0.10 x10*3/uL Final   05/31/2024 12:36 PM 0.09 0.00 - 0.10 x10*3/uL Final   02/22/2024 02:35 PM 0.09 0.00 - 0.10 x10*3/uL Final       No components found for: \"PT\"  aPTT   Date/Time Value Ref Range Status   04/11/2023 12:41 PM 20.5 (L) 22.0 - 32.5 SEC Final     Comment:     SAMPLE INTEGRITY CHECKED  Performed at [place] [id number]         Assessment/Plan    1- BRCA1 positive:     Hx of bilateral breast cancer in 2002 and 2005, both stage 1A, s/p bilateral mastectomies and PPX JH/BSO.     No need for imaging.     Colonoscopy was negative in 2012, and she wants to continue cologuard q 3 years. She continues annual dermatology exams, due in 07/2025      2- SLL stage IA: SLL stage IA with good prognostic markers of normal cytogenetics, CD38 negative, ZAP-70 negative diagnosed by right submandibular lymph node excisional biopsy in May of 2012.   CT CAP 12/23 [facility]ERT. No need to do imaging unless clinically indicated  Labs:WNL, no evidence of CLL in peripheral blood  LN bx report from 2012 is reviewed. 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 Ddx includes CLL/SLL or MZL. Clinical correlation is recommended. Since CBC is normal without atypical lymphocytes and CT CAP does not show any LAP, CLL/SLL are unlikely. It is more consistent with MZL.     Mediport:   - continues to flush Mediport every six weeks.     Plan:  - 12 months follow-up with labs  - every six week Mediport flush.     Diagnoses and all orders for this visit:  Chronic lymphocytic leukemia (Multi)  -     CBC and Auto Differential; Future  -     Comprehensive Metabolic Panel; Future  -     Lactate Dehydrogenase; Future  -     Clinic Appointment Request; Future  Malignant neoplasm of female breast, unspecified estrogen receptor status, unspecified laterality, unspecified site of breast  -     Infusion Appointment Request; Standing  -     Cancer Antigen 27-29; Future  -     Infusion Appointment Request; Future           Ekta Nunes PA-C

## 2025-04-03 ENCOUNTER — APPOINTMENT (OUTPATIENT)
Dept: HEMATOLOGY/ONCOLOGY | Facility: CLINIC | Age: 65
End: 2025-04-03
Payer: COMMERCIAL

## 2025-04-04 DIAGNOSIS — E53.8 VITAMIN B12 DEFICIENCY: ICD-10-CM

## 2025-04-04 RX ORDER — CYANOCOBALAMIN 1000 UG/ML
1000 INJECTION, SOLUTION INTRAMUSCULAR; SUBCUTANEOUS ONCE
Status: COMPLETED | OUTPATIENT
Start: 2025-04-08 | End: 2025-04-08

## 2025-04-07 ENCOUNTER — APPOINTMENT (OUTPATIENT)
Dept: OPHTHALMOLOGY | Facility: CLINIC | Age: 65
End: 2025-04-07
Payer: COMMERCIAL

## 2025-04-07 DIAGNOSIS — H26.491 PCO (POSTERIOR CAPSULAR OPACIFICATION), RIGHT: Primary | ICD-10-CM

## 2025-04-07 DIAGNOSIS — H26.492 PCO (POSTERIOR CAPSULAR OPACIFICATION), LEFT: ICD-10-CM

## 2025-04-07 PROCEDURE — 99024 POSTOP FOLLOW-UP VISIT: CPT | Performed by: OPHTHALMOLOGY

## 2025-04-07 ASSESSMENT — CONF VISUAL FIELD
OD_SUPERIOR_NASAL_RESTRICTION: 0
OS_SUPERIOR_NASAL_RESTRICTION: 0
OS_NORMAL: 1
OD_INFERIOR_NASAL_RESTRICTION: 0
OD_NORMAL: 1
OD_INFERIOR_TEMPORAL_RESTRICTION: 0
OS_INFERIOR_TEMPORAL_RESTRICTION: 0
OD_SUPERIOR_TEMPORAL_RESTRICTION: 0
OS_SUPERIOR_TEMPORAL_RESTRICTION: 0
OS_INFERIOR_NASAL_RESTRICTION: 0

## 2025-04-07 ASSESSMENT — VISUAL ACUITY
CORRECTION_TYPE: GLASSES
METHOD: SNELLEN - LINEAR
OD_CC+: -1
OD_CC: 20/25
OS_CC: 20/30

## 2025-04-07 ASSESSMENT — ENCOUNTER SYMPTOMS
EYES NEGATIVE: 1
ENDOCRINE NEGATIVE: 0
RESPIRATORY NEGATIVE: 0
ALLERGIC/IMMUNOLOGIC NEGATIVE: 0
CARDIOVASCULAR NEGATIVE: 0
HEMATOLOGIC/LYMPHATIC NEGATIVE: 0
PSYCHIATRIC NEGATIVE: 0
MUSCULOSKELETAL NEGATIVE: 0
NEUROLOGICAL NEGATIVE: 0
CONSTITUTIONAL NEGATIVE: 0
GASTROINTESTINAL NEGATIVE: 0

## 2025-04-07 ASSESSMENT — REFRACTION_MANIFEST
OS_SPHERE: -0.50
OS_ADD: +2.50
OD_AXIS: 163
OS_AXIS: 021
OD_CYLINDER: +1.25
OD_ADD: +2.50
OD_SPHERE: -0.50
OS_CYLINDER: +0.75

## 2025-04-07 ASSESSMENT — TONOMETRY
OS_IOP_MMHG: 14
OD_IOP_MMHG: 14
IOP_METHOD: GOLDMANN APPLANATION

## 2025-04-07 ASSESSMENT — REFRACTION_WEARINGRX
OS_CYLINDER: +0.75
OD_SPHERE: +2.00
OD_CYLINDER: +1.50
OS_ADD: +2.50
OS_AXIS: 180
OD_AXIS: 005
OS_SPHERE: +2.00
OD_ADD: +2.50

## 2025-04-07 ASSESSMENT — CUP TO DISC RATIO
OD_RATIO: 0.35
OS_RATIO: 0.4

## 2025-04-07 ASSESSMENT — EXTERNAL EXAM - LEFT EYE: OS_EXAM: NORMAL

## 2025-04-07 ASSESSMENT — EXTERNAL EXAM - RIGHT EYE: OD_EXAM: NORMAL

## 2025-04-07 NOTE — PROGRESS NOTES
Pseudophakia OU   toric lenses OU   trace PCO OD>OS  Patient now s/p yag cap OD, wants to have OS done  Rba reviewed, will schedule      anatomically narrow angles, both eyes: /?if symptoms consistent with intermittent angle closure?   now resolved s/p ce/iol   pach WNL   5/31/23: HVF 24-2 lid artifacts but non-glaucomatous,  OCT RNFL remain WNL and stable to baseline   ok to monitor off therapy   ok to follow clinically without glaucoma testing unless IOP or nerve changes     ptosis/dermatochalsis, both eyes: patient feels like getting in her vision. patient deferred ptosis eval      dry eye/mgd both eyes:   Continue care with Dr. Riley       Erm OD but with preserved foveal contour  monitor

## 2025-04-08 ENCOUNTER — CLINICAL SUPPORT (OUTPATIENT)
Dept: PRIMARY CARE | Facility: CLINIC | Age: 65
End: 2025-04-08
Payer: COMMERCIAL

## 2025-04-08 DIAGNOSIS — E53.8 VITAMIN B12 DEFICIENCY: ICD-10-CM

## 2025-04-08 PROCEDURE — 2500000004 HC RX 250 GENERAL PHARMACY W/ HCPCS (ALT 636 FOR OP/ED): Performed by: INTERNAL MEDICINE

## 2025-04-08 PROCEDURE — 96372 THER/PROPH/DIAG INJ SC/IM: CPT | Performed by: INTERNAL MEDICINE

## 2025-04-08 RX ADMIN — CYANOCOBALAMIN 1000 MCG: 1000 INJECTION, SOLUTION INTRAMUSCULAR at 10:17

## 2025-04-23 ENCOUNTER — APPOINTMENT (OUTPATIENT)
Dept: OPHTHALMOLOGY | Facility: CLINIC | Age: 65
End: 2025-04-23
Payer: COMMERCIAL

## 2025-04-23 DIAGNOSIS — H26.492 PCO (POSTERIOR CAPSULAR OPACIFICATION), LEFT: Primary | ICD-10-CM

## 2025-04-23 PROCEDURE — 66821 AFTER CATARACT LASER SURGERY: CPT | Mod: LEFT SIDE | Performed by: OPHTHALMOLOGY

## 2025-04-23 ASSESSMENT — CONF VISUAL FIELD
OS_INFERIOR_NASAL_RESTRICTION: 0
OD_INFERIOR_TEMPORAL_RESTRICTION: 0
OS_NORMAL: 1
OS_SUPERIOR_NASAL_RESTRICTION: 0
OD_NORMAL: 1
OD_SUPERIOR_NASAL_RESTRICTION: 0
OD_INFERIOR_NASAL_RESTRICTION: 0
OS_SUPERIOR_TEMPORAL_RESTRICTION: 0
OD_SUPERIOR_TEMPORAL_RESTRICTION: 0
OS_INFERIOR_TEMPORAL_RESTRICTION: 0

## 2025-04-23 ASSESSMENT — VISUAL ACUITY
METHOD: SNELLEN - LINEAR
OS_SC: 20/25

## 2025-04-23 ASSESSMENT — EXTERNAL EXAM - RIGHT EYE: OD_EXAM: NORMAL

## 2025-04-23 ASSESSMENT — CUP TO DISC RATIO
OD_RATIO: 0.35
OS_RATIO: 0.4

## 2025-04-23 ASSESSMENT — ENCOUNTER SYMPTOMS
EYES NEGATIVE: 1
GASTROINTESTINAL NEGATIVE: 0
PSYCHIATRIC NEGATIVE: 0
MUSCULOSKELETAL NEGATIVE: 0
CARDIOVASCULAR NEGATIVE: 0
HEMATOLOGIC/LYMPHATIC NEGATIVE: 0
NEUROLOGICAL NEGATIVE: 0
ALLERGIC/IMMUNOLOGIC NEGATIVE: 0
RESPIRATORY NEGATIVE: 0
ENDOCRINE NEGATIVE: 0
CONSTITUTIONAL NEGATIVE: 0

## 2025-04-23 ASSESSMENT — TONOMETRY
IOP_METHOD: GOLDMANN APPLANATION
OS_IOP_MMHG: 17
IOP_METHOD: GOLDMANN APPLANATION

## 2025-04-23 ASSESSMENT — EXTERNAL EXAM - LEFT EYE: OS_EXAM: NORMAL

## 2025-05-02 DIAGNOSIS — R79.89 LOW VITAMIN B12 LEVEL: Primary | ICD-10-CM

## 2025-05-02 RX ORDER — CYANOCOBALAMIN 1000 UG/ML
1000 INJECTION, SOLUTION INTRAMUSCULAR; SUBCUTANEOUS ONCE
Status: COMPLETED | OUTPATIENT
Start: 2025-05-05 | End: 2025-05-06

## 2025-05-06 ENCOUNTER — CLINICAL SUPPORT (OUTPATIENT)
Dept: PRIMARY CARE | Facility: CLINIC | Age: 65
End: 2025-05-06
Payer: COMMERCIAL

## 2025-05-06 DIAGNOSIS — E53.8 VITAMIN B12 DEFICIENCY: ICD-10-CM

## 2025-05-06 PROCEDURE — 2500000004 HC RX 250 GENERAL PHARMACY W/ HCPCS (ALT 636 FOR OP/ED): Mod: JZ | Performed by: INTERNAL MEDICINE

## 2025-05-06 PROCEDURE — 96372 THER/PROPH/DIAG INJ SC/IM: CPT | Performed by: INTERNAL MEDICINE

## 2025-05-06 RX ADMIN — CYANOCOBALAMIN 1000 MCG: 1000 INJECTION, SOLUTION INTRAMUSCULAR at 09:55

## 2025-05-08 ENCOUNTER — INFUSION (OUTPATIENT)
Dept: HEMATOLOGY/ONCOLOGY | Facility: CLINIC | Age: 65
End: 2025-05-08
Payer: COMMERCIAL

## 2025-05-08 DIAGNOSIS — Z85.3 PERSONAL HISTORY OF MALIGNANT NEOPLASM OF BREAST: ICD-10-CM

## 2025-05-08 DIAGNOSIS — C50.919 MALIGNANT NEOPLASM OF FEMALE BREAST, UNSPECIFIED ESTROGEN RECEPTOR STATUS, UNSPECIFIED LATERALITY, UNSPECIFIED SITE OF BREAST: ICD-10-CM

## 2025-05-08 PROCEDURE — 96523 IRRIG DRUG DELIVERY DEVICE: CPT

## 2025-05-08 PROCEDURE — 2500000004 HC RX 250 GENERAL PHARMACY W/ HCPCS (ALT 636 FOR OP/ED): Mod: JZ | Performed by: NURSE PRACTITIONER

## 2025-05-08 RX ORDER — HEPARIN SODIUM,PORCINE/PF 10 UNIT/ML
50 SYRINGE (ML) INTRAVENOUS AS NEEDED
Status: DISCONTINUED | OUTPATIENT
Start: 2025-05-08 | End: 2025-05-08 | Stop reason: HOSPADM

## 2025-05-08 RX ORDER — HEPARIN 100 UNIT/ML
500 SYRINGE INTRAVENOUS AS NEEDED
OUTPATIENT
Start: 2025-05-08

## 2025-05-08 RX ORDER — HEPARIN 100 UNIT/ML
500 SYRINGE INTRAVENOUS AS NEEDED
Status: DISCONTINUED | OUTPATIENT
Start: 2025-05-08 | End: 2025-05-08 | Stop reason: HOSPADM

## 2025-05-08 RX ORDER — HEPARIN SODIUM,PORCINE/PF 10 UNIT/ML
50 SYRINGE (ML) INTRAVENOUS AS NEEDED
OUTPATIENT
Start: 2025-05-08

## 2025-05-08 RX ADMIN — HEPARIN 500 UNITS: 100 SYRINGE at 14:25

## 2025-06-04 ENCOUNTER — APPOINTMENT (OUTPATIENT)
Dept: HEMATOLOGY/ONCOLOGY | Facility: CLINIC | Age: 65
End: 2025-06-04
Payer: COMMERCIAL

## 2025-06-06 DIAGNOSIS — E53.8 VITAMIN B12 DEFICIENCY: ICD-10-CM

## 2025-06-06 RX ORDER — CYANOCOBALAMIN 1000 UG/ML
1000 INJECTION, SOLUTION INTRAMUSCULAR; SUBCUTANEOUS ONCE
Status: COMPLETED | OUTPATIENT
Start: 2025-06-10 | End: 2025-06-10

## 2025-06-10 ENCOUNTER — CLINICAL SUPPORT (OUTPATIENT)
Dept: PRIMARY CARE | Facility: CLINIC | Age: 65
End: 2025-06-10
Payer: COMMERCIAL

## 2025-06-10 DIAGNOSIS — E53.8 VITAMIN B12 DEFICIENCY: ICD-10-CM

## 2025-06-10 PROCEDURE — 2500000004 HC RX 250 GENERAL PHARMACY W/ HCPCS (ALT 636 FOR OP/ED): Performed by: PHYSICIAN ASSISTANT

## 2025-06-10 PROCEDURE — 96372 THER/PROPH/DIAG INJ SC/IM: CPT | Performed by: PHYSICIAN ASSISTANT

## 2025-06-10 RX ADMIN — CYANOCOBALAMIN 1000 MCG: 1000 INJECTION, SOLUTION INTRAMUSCULAR at 10:11

## 2025-06-12 ENCOUNTER — APPOINTMENT (OUTPATIENT)
Facility: CLINIC | Age: 65
End: 2025-06-12
Payer: COMMERCIAL

## 2025-06-18 ENCOUNTER — APPOINTMENT (OUTPATIENT)
Dept: OPHTHALMOLOGY | Facility: CLINIC | Age: 65
End: 2025-06-18
Payer: COMMERCIAL

## 2025-06-18 DIAGNOSIS — H26.492 PCO (POSTERIOR CAPSULAR OPACIFICATION), LEFT: Primary | ICD-10-CM

## 2025-06-18 DIAGNOSIS — H26.491 PCO (POSTERIOR CAPSULAR OPACIFICATION), RIGHT: ICD-10-CM

## 2025-06-18 PROCEDURE — 99024 POSTOP FOLLOW-UP VISIT: CPT | Performed by: OPHTHALMOLOGY

## 2025-06-18 RX ORDER — CYCLOSPORINE 0.5 MG/ML
EMULSION OPHTHALMIC
COMMUNITY
Start: 2025-06-09

## 2025-06-18 ASSESSMENT — EXTERNAL EXAM - RIGHT EYE: OD_EXAM: NORMAL

## 2025-06-18 ASSESSMENT — VISUAL ACUITY
OS_SC: 20/25
OD_PH_SC: 20/25
OD_SC+: +2
OD_SC: 20/30
METHOD: SNELLEN - LINEAR
OS_SC+: -2

## 2025-06-18 ASSESSMENT — EXTERNAL EXAM - LEFT EYE: OS_EXAM: NORMAL

## 2025-06-18 ASSESSMENT — TONOMETRY
IOP_METHOD: GOLDMANN APPLANATION
OS_IOP_MMHG: 13
OD_IOP_MMHG: 13

## 2025-06-18 NOTE — PROGRESS NOTES
Pseudophakia OU   toric lenses OU   trace PCO OD>OS  Patient now s/p yag cap OU  Most recently yag cap OS 4/23/25, reports improvement in vision      anatomically narrow angles, both eyes: /?if symptoms consistent with intermittent angle closure?   now resolved s/p ce/iol   pach WNL   5/31/23: HVF 24-2 lid artifacts but non-glaucomatous,  OCT RNFL remain WNL and stable to baseline   ok to monitor off therapy   ok to follow clinically without glaucoma testing unless IOP or nerve changes     ptosis/dermatochalsis, both eyes: patient feels like getting in her vision. patient deferred ptosis eval      dry eye/mgd both eyes:   Continue care with Dr. Riley       Erm OD but with preserved foveal contour  Monitor    Rtc sozeri 12 months for DFE/OCT RNFL.oct mac  Contiue care with Dr. Riley in the meantime

## 2025-06-19 ENCOUNTER — APPOINTMENT (OUTPATIENT)
Dept: PSYCHOLOGY | Facility: HOSPITAL | Age: 65
End: 2025-06-19
Payer: COMMERCIAL

## 2025-06-24 ENCOUNTER — INFUSION (OUTPATIENT)
Dept: HEMATOLOGY/ONCOLOGY | Facility: CLINIC | Age: 65
End: 2025-06-24
Payer: COMMERCIAL

## 2025-06-24 DIAGNOSIS — C91.10 CHRONIC LYMPHOCYTIC LEUKEMIA (MULTI): ICD-10-CM

## 2025-06-24 DIAGNOSIS — Z85.3 PERSONAL HISTORY OF MALIGNANT NEOPLASM OF BREAST: ICD-10-CM

## 2025-06-24 DIAGNOSIS — C50.919 MALIGNANT NEOPLASM OF FEMALE BREAST, UNSPECIFIED ESTROGEN RECEPTOR STATUS, UNSPECIFIED LATERALITY, UNSPECIFIED SITE OF BREAST: ICD-10-CM

## 2025-06-24 LAB
25(OH)D3 SERPL-MCNC: 38 NG/ML (ref 30–100)
ALBUMIN SERPL BCP-MCNC: 4.1 G/DL (ref 3.4–5)
ALP SERPL-CCNC: 69 U/L (ref 33–136)
ALT SERPL W P-5'-P-CCNC: 14 U/L (ref 7–45)
ANION GAP SERPL CALC-SCNC: 10 MMOL/L (ref 10–20)
AST SERPL W P-5'-P-CCNC: 16 U/L (ref 9–39)
BASOPHILS # BLD AUTO: 0.09 X10*3/UL (ref 0–0.1)
BASOPHILS NFR BLD AUTO: 1.3 %
BILIRUB SERPL-MCNC: 0.5 MG/DL (ref 0–1.2)
BUN SERPL-MCNC: 7 MG/DL (ref 6–23)
CALCIUM SERPL-MCNC: 9 MG/DL (ref 8.6–10.3)
CHLORIDE SERPL-SCNC: 97 MMOL/L (ref 98–107)
CO2 SERPL-SCNC: 30 MMOL/L (ref 21–32)
CREAT SERPL-MCNC: 0.62 MG/DL (ref 0.5–1.05)
EGFRCR SERPLBLD CKD-EPI 2021: >90 ML/MIN/1.73M*2
EOSINOPHIL # BLD AUTO: 0.19 X10*3/UL (ref 0–0.7)
EOSINOPHIL NFR BLD AUTO: 2.7 %
ERYTHROCYTE [DISTWIDTH] IN BLOOD BY AUTOMATED COUNT: 13.2 % (ref 11.5–14.5)
FERRITIN SERPL-MCNC: 68 NG/ML (ref 8–150)
GLUCOSE SERPL-MCNC: 107 MG/DL (ref 74–99)
HCT VFR BLD AUTO: 41.7 % (ref 36–46)
HGB BLD-MCNC: 14.1 G/DL (ref 12–16)
IMM GRANULOCYTES # BLD AUTO: 0.01 X10*3/UL (ref 0–0.7)
IMM GRANULOCYTES NFR BLD AUTO: 0.1 % (ref 0–0.9)
IRON SATN MFR SERPL: 25 % (ref 25–45)
IRON SERPL-MCNC: 86 UG/DL (ref 35–150)
LYMPHOCYTES # BLD AUTO: 2.42 X10*3/UL (ref 1.2–4.8)
LYMPHOCYTES NFR BLD AUTO: 33.9 %
MCH RBC QN AUTO: 30.4 PG (ref 26–34)
MCHC RBC AUTO-ENTMCNC: 33.8 G/DL (ref 32–36)
MCV RBC AUTO: 90 FL (ref 80–100)
MONOCYTES # BLD AUTO: 0.55 X10*3/UL (ref 0.1–1)
MONOCYTES NFR BLD AUTO: 7.7 %
NEUTROPHILS # BLD AUTO: 3.87 X10*3/UL (ref 1.2–7.7)
NEUTROPHILS NFR BLD AUTO: 54.3 %
NRBC BLD-RTO: 0 /100 WBCS (ref 0–0)
PLATELET # BLD AUTO: 334 X10*3/UL (ref 150–450)
POTASSIUM SERPL-SCNC: 3.6 MMOL/L (ref 3.5–5.3)
PROT SERPL-MCNC: 6.8 G/DL (ref 6.4–8.2)
RBC # BLD AUTO: 4.64 X10*6/UL (ref 4–5.2)
SODIUM SERPL-SCNC: 133 MMOL/L (ref 136–145)
TIBC SERPL-MCNC: 349 UG/DL (ref 240–445)
TSH SERPL-ACNC: 0.87 MIU/L (ref 0.44–3.98)
UIBC SERPL-MCNC: 263 UG/DL (ref 110–370)
VIT B12 SERPL-MCNC: 631 PG/ML (ref 211–911)
WBC # BLD AUTO: 7.1 X10*3/UL (ref 4.4–11.3)

## 2025-06-24 PROCEDURE — 82607 VITAMIN B-12: CPT

## 2025-06-24 PROCEDURE — 2500000004 HC RX 250 GENERAL PHARMACY W/ HCPCS (ALT 636 FOR OP/ED): Performed by: NURSE PRACTITIONER

## 2025-06-24 PROCEDURE — 80053 COMPREHEN METABOLIC PANEL: CPT

## 2025-06-24 PROCEDURE — 82306 VITAMIN D 25 HYDROXY: CPT

## 2025-06-24 PROCEDURE — 82728 ASSAY OF FERRITIN: CPT

## 2025-06-24 PROCEDURE — 84443 ASSAY THYROID STIM HORMONE: CPT

## 2025-06-24 PROCEDURE — 85025 COMPLETE CBC W/AUTO DIFF WBC: CPT

## 2025-06-24 PROCEDURE — 36591 DRAW BLOOD OFF VENOUS DEVICE: CPT

## 2025-06-24 PROCEDURE — 83550 IRON BINDING TEST: CPT

## 2025-06-24 RX ORDER — HEPARIN 100 UNIT/ML
500 SYRINGE INTRAVENOUS AS NEEDED
OUTPATIENT
Start: 2025-06-24

## 2025-06-24 RX ORDER — HEPARIN SODIUM,PORCINE/PF 10 UNIT/ML
50 SYRINGE (ML) INTRAVENOUS AS NEEDED
Status: DISCONTINUED | OUTPATIENT
Start: 2025-06-24 | End: 2025-06-24 | Stop reason: HOSPADM

## 2025-06-24 RX ORDER — HEPARIN SODIUM,PORCINE/PF 10 UNIT/ML
50 SYRINGE (ML) INTRAVENOUS AS NEEDED
OUTPATIENT
Start: 2025-06-24

## 2025-06-24 RX ORDER — HEPARIN 100 UNIT/ML
500 SYRINGE INTRAVENOUS AS NEEDED
Status: DISCONTINUED | OUTPATIENT
Start: 2025-06-24 | End: 2025-06-24 | Stop reason: HOSPADM

## 2025-06-24 RX ADMIN — HEPARIN 500 UNITS: 100 SYRINGE at 14:31

## 2025-07-08 DIAGNOSIS — Z12.31 ENCOUNTER FOR SCREENING MAMMOGRAM FOR BREAST CANCER: ICD-10-CM

## 2025-07-11 DIAGNOSIS — E53.8 VITAMIN B12 DEFICIENCY: ICD-10-CM

## 2025-07-11 RX ORDER — CYANOCOBALAMIN 1000 UG/ML
1000 INJECTION, SOLUTION INTRAMUSCULAR; SUBCUTANEOUS ONCE
Status: COMPLETED | OUTPATIENT
Start: 2025-07-15 | End: 2025-07-15

## 2025-07-15 ENCOUNTER — TELEMEDICINE (OUTPATIENT)
Dept: PRIMARY CARE | Facility: CLINIC | Age: 65
End: 2025-07-15
Payer: COMMERCIAL

## 2025-07-15 ENCOUNTER — TELEPHONE (OUTPATIENT)
Dept: PRIMARY CARE | Facility: CLINIC | Age: 65
End: 2025-07-15

## 2025-07-15 ENCOUNTER — CLINICAL SUPPORT (OUTPATIENT)
Dept: PRIMARY CARE | Facility: CLINIC | Age: 65
End: 2025-07-15
Payer: COMMERCIAL

## 2025-07-15 DIAGNOSIS — J44.9 CHRONIC OBSTRUCTIVE PULMONARY DISEASE, UNSPECIFIED COPD TYPE (MULTI): ICD-10-CM

## 2025-07-15 DIAGNOSIS — M54.50 ACUTE BILATERAL LOW BACK PAIN WITHOUT SCIATICA: Primary | ICD-10-CM

## 2025-07-15 DIAGNOSIS — E53.8 VITAMIN B12 DEFICIENCY: ICD-10-CM

## 2025-07-15 DIAGNOSIS — C83.00 SMALL B-CELL LYMPHOMA, UNSPECIFIED BODY REGION (MULTI): ICD-10-CM

## 2025-07-15 PROCEDURE — 99213 OFFICE O/P EST LOW 20 MIN: CPT | Performed by: INTERNAL MEDICINE

## 2025-07-15 PROCEDURE — 1159F MED LIST DOCD IN RCRD: CPT | Performed by: INTERNAL MEDICINE

## 2025-07-15 PROCEDURE — 2500000004 HC RX 250 GENERAL PHARMACY W/ HCPCS (ALT 636 FOR OP/ED): Performed by: INTERNAL MEDICINE

## 2025-07-15 PROCEDURE — 96372 THER/PROPH/DIAG INJ SC/IM: CPT | Performed by: INTERNAL MEDICINE

## 2025-07-15 RX ORDER — TIZANIDINE 2 MG/1
2 TABLET ORAL EVERY 6 HOURS PRN
Qty: 30 TABLET | Refills: 0 | Status: SHIPPED | OUTPATIENT
Start: 2025-07-15 | End: 2025-07-25

## 2025-07-15 RX ADMIN — CYANOCOBALAMIN 1000 MCG: 1000 INJECTION, SOLUTION INTRAMUSCULAR; SUBCUTANEOUS at 09:17

## 2025-07-15 NOTE — TELEPHONE ENCOUNTER
"Pt was in for a nurse visit today, She has a complaint of back pain that she stated she tweaked it a few days ago bending while vrushing her teeth, She said the pain is to her lower back is a 4/ 10. She is walking with a bent gait. She said this happens ever so often and the only thing she has taken is tylenol and is not helping much, Is asking of possible for her to get a muscle relaxer to help with the pain. She describes the nerve pain as \"sitting there waiting for something worse to happen.\" Please review and advise, thank you  "

## 2025-07-15 NOTE — PROGRESS NOTES
Subjective   Patient ID: Erin Ramachandran is a 65 y.o. female who presents for Back Pain.    This is a 65 y.o. who states that she has had chronic LBP for years, but had been fine for the 2 past yrs, until yesterday. She states that she bent over and couldn't straughten back up. She has midline back pain and it doesn't radiate. Muscle relaxers have helped in the past.    PROCEDURE:         SPINE LUMBOSACRAL 2 VIEW - AXR  0032  REASON FOR EXAM: pain     RESULT: HISTORY: Pain     TECHNIQUE: Two views of the lumbar spine were performed.     FINDINGS:     No acute fracture is seen. There is prominent facet arthropathy lower lumbar  spine with very slight anterior subluxation of L4 on L5. There is mild disc  space narrowing with spurring at the L4 and L5 levels. Other disks are  better preserved.     IMPRESSION: No acute fracture.     Degenerative changes L4 and L5 levels as described.     This report has been produced using speech recognition.        Original Interpreting Physician:   CONSTANCE CARBONE M.D.  Original Transcribed by/Date: PSCB   Aug 12 2017  4:07P  Original Electronically Signed by/Date: CONSTANCE CARBONE M.D. Aug 12 2017  4:07P         Back Pain  This is a chronic problem. The current episode started yesterday. The problem occurs constantly. The problem is unchanged. The pain is present in the lumbar spine. The quality of the pain is described as aching. The pain does not radiate. The pain is moderate. The pain is The same all the time. The symptoms are aggravated by bending. Pertinent negatives include no bladder incontinence, bowel incontinence, dysuria, fever, leg pain, numbness, paresis, paresthesias, perianal numbness, tingling or weakness. Risk factors include history of cancer and menopause. She has tried nothing for the symptoms. The treatment provided no relief.        Review of Systems   Constitutional:  Negative for fever.   Gastrointestinal:  Negative for bowel incontinence.   Genitourinary:  Negative  for bladder incontinence and dysuria.   Musculoskeletal:  Positive for back pain.   Neurological:  Negative for tingling, weakness, numbness and paresthesias.       Objective   There were no vitals taken for this visit.    Physical Exam  Constitutional:       General: She is not in acute distress.     Appearance: Normal appearance. She is not ill-appearing.   Pulmonary:      Effort: Pulmonary effort is normal.     Neurological:      General: No focal deficit present.      Mental Status: She is alert and oriented to person, place, and time.     Psychiatric:         Mood and Affect: Mood normal.         Assessment/Plan   Diagnoses and all orders for this visit:  Acute bilateral low back pain without sciatica  Comments:  If no relief, pt may require Xray and referral to physical therapy  Orders:  -     tiZANidine (Zanaflex) 2 mg tablet; Take 1 tablet (2 mg) by mouth every 6 hours if needed for muscle spasms for up to 10 days.  Small B-cell lymphoma, unspecified body region (Multi)  Comments:  Under care of oncologist  Chronic obstructive pulmonary disease, unspecified COPD type (Multi)  Comments:  Smoking cessation encouraged       Virtual or Telephone Consent    An interactive audio and video telecommunication system which permits real time communications between the patient (at the originating site) and provider (at the distant site) was utilized to provide this telehealth service.   Verbal consent was requested and obtained from Erin Ramachandran on this date, 7/15/25 for a telehealth visit and the patient's location was confirmed at the time of the visit.

## 2025-07-16 PROBLEM — J44.9 CHRONIC OBSTRUCTIVE PULMONARY DISEASE (MULTI): Status: RESOLVED | Noted: 2023-12-18 | Resolved: 2025-07-16

## 2025-07-16 PROBLEM — C83.00: Status: ACTIVE | Noted: 2025-07-16

## 2025-07-16 ASSESSMENT — ENCOUNTER SYMPTOMS
TINGLING: 0
NUMBNESS: 0
LEG PAIN: 0
WEAKNESS: 0
DYSURIA: 0
PARESTHESIAS: 0
PERIANAL NUMBNESS: 0
BOWEL INCONTINENCE: 0
PARESIS: 0
BACK PAIN: 1
FEVER: 0

## 2025-08-05 ENCOUNTER — APPOINTMENT (OUTPATIENT)
Dept: HEMATOLOGY/ONCOLOGY | Facility: CLINIC | Age: 65
End: 2025-08-05
Payer: COMMERCIAL

## 2025-08-07 ENCOUNTER — INFUSION (OUTPATIENT)
Dept: HEMATOLOGY/ONCOLOGY | Facility: CLINIC | Age: 65
End: 2025-08-07
Payer: COMMERCIAL

## 2025-08-07 DIAGNOSIS — C50.919 MALIGNANT NEOPLASM OF FEMALE BREAST, UNSPECIFIED ESTROGEN RECEPTOR STATUS, UNSPECIFIED LATERALITY, UNSPECIFIED SITE OF BREAST: ICD-10-CM

## 2025-08-07 DIAGNOSIS — Z85.3 PERSONAL HISTORY OF MALIGNANT NEOPLASM OF BREAST: ICD-10-CM

## 2025-08-07 PROCEDURE — 96523 IRRIG DRUG DELIVERY DEVICE: CPT

## 2025-08-07 PROCEDURE — 2500000004 HC RX 250 GENERAL PHARMACY W/ HCPCS (ALT 636 FOR OP/ED): Performed by: NURSE PRACTITIONER

## 2025-08-07 RX ORDER — HEPARIN SODIUM,PORCINE/PF 10 UNIT/ML
50 SYRINGE (ML) INTRAVENOUS AS NEEDED
Status: DISCONTINUED | OUTPATIENT
Start: 2025-08-07 | End: 2025-08-07 | Stop reason: HOSPADM

## 2025-08-07 RX ORDER — HEPARIN 100 UNIT/ML
500 SYRINGE INTRAVENOUS AS NEEDED
OUTPATIENT
Start: 2025-08-07

## 2025-08-07 RX ORDER — HEPARIN SODIUM,PORCINE/PF 10 UNIT/ML
50 SYRINGE (ML) INTRAVENOUS AS NEEDED
OUTPATIENT
Start: 2025-08-07

## 2025-08-07 RX ORDER — HEPARIN 100 UNIT/ML
500 SYRINGE INTRAVENOUS AS NEEDED
Status: DISCONTINUED | OUTPATIENT
Start: 2025-08-07 | End: 2025-08-07 | Stop reason: HOSPADM

## 2025-08-07 RX ADMIN — HEPARIN 500 UNITS: 100 SYRINGE at 15:12

## 2025-08-14 DIAGNOSIS — E53.8 B12 DEFICIENCY: ICD-10-CM

## 2025-08-14 RX ORDER — CYANOCOBALAMIN 1000 UG/ML
1000 INJECTION, SOLUTION INTRAMUSCULAR; SUBCUTANEOUS ONCE
Status: COMPLETED | OUTPATIENT
Start: 2025-08-18 | End: 2025-08-21

## 2025-08-18 ENCOUNTER — APPOINTMENT (OUTPATIENT)
Dept: PRIMARY CARE | Facility: CLINIC | Age: 65
End: 2025-08-18
Payer: COMMERCIAL

## 2025-08-21 ENCOUNTER — CLINICAL SUPPORT (OUTPATIENT)
Dept: PRIMARY CARE | Facility: CLINIC | Age: 65
End: 2025-08-21
Payer: COMMERCIAL

## 2025-08-21 DIAGNOSIS — E53.8 B12 DEFICIENCY: ICD-10-CM

## 2025-08-21 PROCEDURE — 96372 THER/PROPH/DIAG INJ SC/IM: CPT | Performed by: INTERNAL MEDICINE

## 2025-08-21 PROCEDURE — 2500000004 HC RX 250 GENERAL PHARMACY W/ HCPCS (ALT 636 FOR OP/ED): Performed by: INTERNAL MEDICINE

## 2025-08-21 RX ADMIN — CYANOCOBALAMIN 1000 MCG: 1000 INJECTION, SOLUTION INTRAMUSCULAR; SUBCUTANEOUS at 10:11

## 2025-10-09 ENCOUNTER — APPOINTMENT (OUTPATIENT)
Dept: OPHTHALMOLOGY | Facility: CLINIC | Age: 65
End: 2025-10-09
Payer: COMMERCIAL

## 2026-06-24 ENCOUNTER — APPOINTMENT (OUTPATIENT)
Dept: OPHTHALMOLOGY | Facility: CLINIC | Age: 66
End: 2026-06-24
Payer: COMMERCIAL